# Patient Record
Sex: MALE | Race: WHITE | NOT HISPANIC OR LATINO | Employment: FULL TIME
[De-identification: names, ages, dates, MRNs, and addresses within clinical notes are randomized per-mention and may not be internally consistent; named-entity substitution may affect disease eponyms.]

---

## 2017-01-25 ENCOUNTER — RECORDS - HEALTHEAST (OUTPATIENT)
Dept: ADMINISTRATIVE | Facility: OTHER | Age: 27
End: 2017-01-25

## 2020-11-02 ENCOUNTER — OFFICE VISIT - HEALTHEAST (OUTPATIENT)
Dept: FAMILY MEDICINE | Facility: CLINIC | Age: 30
End: 2020-11-02

## 2020-11-02 DIAGNOSIS — L40.9 PSORIASIS: ICD-10-CM

## 2020-11-02 ASSESSMENT — MIFFLIN-ST. JEOR: SCORE: 1723.09

## 2020-11-03 ENCOUNTER — COMMUNICATION - HEALTHEAST (OUTPATIENT)
Dept: ADMINISTRATIVE | Facility: CLINIC | Age: 30
End: 2020-11-03

## 2021-05-26 ENCOUNTER — RECORDS - HEALTHEAST (OUTPATIENT)
Dept: ADMINISTRATIVE | Facility: CLINIC | Age: 31
End: 2021-05-26

## 2021-05-28 ENCOUNTER — RECORDS - HEALTHEAST (OUTPATIENT)
Dept: ADMINISTRATIVE | Facility: CLINIC | Age: 31
End: 2021-05-28

## 2021-06-03 ENCOUNTER — RECORDS - HEALTHEAST (OUTPATIENT)
Dept: ADMINISTRATIVE | Facility: CLINIC | Age: 31
End: 2021-06-03

## 2021-06-04 VITALS
DIASTOLIC BLOOD PRESSURE: 68 MMHG | OXYGEN SATURATION: 96 % | WEIGHT: 185 LBS | BODY MASS INDEX: 30.82 KG/M2 | HEART RATE: 94 BPM | HEIGHT: 65 IN | SYSTOLIC BLOOD PRESSURE: 100 MMHG

## 2021-06-06 ENCOUNTER — HEALTH MAINTENANCE LETTER (OUTPATIENT)
Age: 31
End: 2021-06-06

## 2021-06-12 NOTE — PROGRESS NOTES
ASSESSMENT/PLAN:       1. Psoriasis involving the scalp and left lower leg  Shared with the patient that the etiology is not clear but sometimes can be related to triggers such as stress, smoking and infectious diseases.  No clear trigger that occurred 8 months ago.  I shared with the patient that there are many more treatment options for psoriasis and thus I would like to have a dermatology consultation but while were waiting for that begins some steroids with a solution to be used on the scalp and an ointment on the left lower leg.  He certainly can continue with the cold tar-based shampoo for his scalp as that does seem to be providing some benefit.    - Ambulatory referral to Dermatology  - fluocinolone (SYNALAR) 0.01 % external solution; Apply to scalp twice daily for psoriasis  Dispense: 60 mL; Refill: 0  - clobetasoL (TEMOVATE) 0.05 % ointment; Apply to rash leg twice daily prn  Dispense: 45 g; Refill: 1            Lopez Summers MD      PROGRESS NOTE   11/2/2020    SUBJECTIVE:  Keshawn Jimenez is a 30 y.o. male  who presents for   Chief Complaint   Patient presents with     Psoriasis     Psoriasis on head and left leg      The patient is here today to talk about a rash that has been present for about 8 months.  Over that 8-month period of time it has become a larger area in the 2 regions where it first was noted which is the frontal scalp along the hairline and also the left lower leg.  The rash on the hairline and scalp is been more red and itchy with thick scale and cracks in the skin.  The last month the area has grown quite a bit and become more symptomatic.  For the last week he has been using a cold tar-based shampoo called .  It seems to be drying it up a bit on the scalp but actually now more cracks are present and there is some discomfort.  On the left lower leg gets over the shin started as a small silver dollar sized area and now it is much larger does not have a stick of a scale but does  "have a plaque-like appearance with very minimal cracking or fissuring of the skin.    The patient is single living at home with his parents and works as a baker.  He has had a history of heroin abuse and also methamphetamine abuse currently is on methadone 70 mg every day.  No alcohol use  Smokes cigarettes    Patient Active Problem List   Diagnosis     Anxiety     History of heroin abuse (H)     History of amphetamine dependence/abuse (H)     Allergic Rhinitis     Lower Back Pain     Chronic Constipation     Abdominal Pain In The Right Lower Belly (RLQ)     Long-term current use of methadone for opiate dependence (H)       Current Outpatient Medications   Medication Sig Dispense Refill     methadone (DOLOPHINE) 10 mg/mL solution Take 70 mg by mouth daily.        clobetasoL (TEMOVATE) 0.05 % ointment Apply to rash leg twice daily prn 45 g 1     fluocinolone (SYNALAR) 0.01 % external solution Apply to scalp twice daily for psoriasis 60 mL 0     No current facility-administered medications for this visit.        Social History     Tobacco Use   Smoking Status Current Every Day Smoker     Packs/day: 0.50     Types: Cigarettes   Smokeless Tobacco Never Used           OBJECTIVE:        No results found for this or any previous visit (from the past 240 hour(s)).    Vitals:    11/02/20 1426   BP: 100/68   Pulse: 94   SpO2: 96%   Weight: 185 lb (83.9 kg)   Height: 5' 4.5\" (1.638 m)     Weight: 185 lb (83.9 kg)          Physical Exam:  GENERAL APPEARANCE: Very pleasant 30-year-old male, NAD, well hydrated, well nourished  SKIN:  Normal skin turgor, the rash on the scalp is along the hairline from the frontal to the temporal region with erythema cracking of the skin a fairly thick scale and only extends backward about 2-3 cm.  On the left lower leg on the shin skin color is more salmon-colored with a scale is not as thick and not as much fissuring or cracking of the skin.  It has more of a fine scale with no weeping or " drainage.  HEENT: moist mucous membranes, no rhinorrhea  NECK: Normal without adenopathy or masses  EXTREMITY: no edema and full ROM of all joints, there is no evidence of synovitis particularly in the small joints and also the fingernails and toenails do not show any deformities or pitting of the nails.  NEURO: no focal findings

## 2021-06-26 ENCOUNTER — HOSPITAL ENCOUNTER (EMERGENCY)
Dept: EMERGENCY MEDICINE | Facility: CLINIC | Age: 31
Discharge: HOME | End: 2021-06-26
Attending: STUDENT IN AN ORGANIZED HEALTH CARE EDUCATION/TRAINING PROGRAM
Payer: COMMERCIAL

## 2021-06-26 DIAGNOSIS — R31.29 MICROSCOPIC HEMATURIA: ICD-10-CM

## 2021-06-26 DIAGNOSIS — N20.1 URETERAL STONE: ICD-10-CM

## 2021-06-26 DIAGNOSIS — R10.9 FLANK PAIN: ICD-10-CM

## 2021-06-26 LAB
ALBUMIN SERPL-MCNC: 4.1 G/DL (ref 3.5–5)
ALBUMIN UR-MCNC: NEGATIVE G/DL
ALP SERPL-CCNC: 53 U/L (ref 45–120)
ALT SERPL W P-5'-P-CCNC: 30 U/L (ref 0–45)
ANION GAP SERPL CALCULATED.3IONS-SCNC: 10 MMOL/L (ref 5–18)
APPEARANCE UR: CLEAR
AST SERPL W P-5'-P-CCNC: 21 U/L (ref 0–40)
BACTERIA #/AREA URNS HPF: ABNORMAL /[HPF]
BILIRUB DIRECT SERPL-MCNC: 0.3 MG/DL
BILIRUB SERPL-MCNC: 1 MG/DL (ref 0–1)
BILIRUB UR QL STRIP: NEGATIVE
BUN SERPL-MCNC: 13 MG/DL (ref 8–22)
CALCIUM SERPL-MCNC: 9.2 MG/DL (ref 8.5–10.5)
CHLORIDE BLD-SCNC: 104 MMOL/L (ref 98–107)
CO2 SERPL-SCNC: 25 MMOL/L (ref 22–31)
COLOR UR AUTO: ABNORMAL
CREAT SERPL-MCNC: 0.85 MG/DL (ref 0.7–1.3)
ERYTHROCYTE [DISTWIDTH] IN BLOOD BY AUTOMATED COUNT: 11.5 % (ref 11–14.5)
GFR SERPL CREATININE-BSD FRML MDRD: >60 ML/MIN/1.73M2
GLUCOSE BLD-MCNC: 114 MG/DL (ref 70–125)
GLUCOSE UR STRIP-MCNC: NEGATIVE MG/DL
HCT VFR BLD AUTO: 46.8 % (ref 40–54)
HGB BLD-MCNC: 16.3 G/DL (ref 14–18)
HGB UR QL STRIP: ABNORMAL
KETONES UR STRIP-MCNC: NEGATIVE MG/DL
LEUKOCYTE ESTERASE UR QL STRIP: NEGATIVE
LIPASE SERPL-CCNC: 13 U/L (ref 0–52)
MCH RBC QN AUTO: 30 PG (ref 27–34)
MCHC RBC AUTO-ENTMCNC: 34.8 G/DL (ref 32–36)
MCV RBC AUTO: 86 FL (ref 80–100)
MUCOUS THREADS #/AREA URNS LPF: PRESENT LPF
NITRATE UR QL: NEGATIVE
PH UR STRIP: 6 [PH] (ref 5–8)
PLATELET # BLD AUTO: 203 THOU/UL (ref 140–440)
PMV BLD AUTO: 9.3 FL (ref 8.5–12.5)
POTASSIUM BLD-SCNC: 4.1 MMOL/L (ref 3.5–5)
PROT SERPL-MCNC: 7.6 G/DL (ref 6–8)
RBC # BLD AUTO: 5.43 MILL/UL (ref 4.4–6.2)
RBC URINE: 6 HPF
SODIUM SERPL-SCNC: 139 MMOL/L (ref 136–145)
SP GR UR STRIP: 1.02 (ref 1–1.03)
SQUAMOUS EPITHELIAL: 0 /HPF
UROBILINOGEN UR STRIP-ACNC: ABNORMAL
WBC URINE: 1 HPF
WBC: 11 THOU/UL (ref 4–11)

## 2021-06-26 ASSESSMENT — MIFFLIN-ST. JEOR: SCORE: 1685.67

## 2021-06-29 ENCOUNTER — OFFICE VISIT - HEALTHEAST (OUTPATIENT)
Dept: UROLOGY | Facility: CLINIC | Age: 31
End: 2021-06-29

## 2021-06-29 DIAGNOSIS — N20.1 CALCULUS OF URETER: ICD-10-CM

## 2021-06-29 DIAGNOSIS — N20.0 CALCULUS OF KIDNEY: ICD-10-CM

## 2021-06-29 DIAGNOSIS — N13.2 HYDRONEPHROSIS WITH URINARY OBSTRUCTION DUE TO URETERAL CALCULUS: ICD-10-CM

## 2021-06-30 ENCOUNTER — COMMUNICATION - HEALTHEAST (OUTPATIENT)
Dept: UROLOGY | Facility: CLINIC | Age: 31
End: 2021-06-30

## 2021-07-06 VITALS — BODY MASS INDEX: 29.16 KG/M2 | HEIGHT: 65 IN | WEIGHT: 175 LBS

## 2021-07-07 NOTE — PATIENT INSTRUCTIONS - HE
Patient Stated Goal: Prevent further stones  Steps for collecting a 24 hour urine specimen    Please follow the directions carefully. All urine voided for a 24-hour period needs to be collected into the jug.  DO NOT change any of your  normal  daily habits when doing this test. Continue to follow your regular diet, intake of fluids, and usual activity level. Pick the most convenient day with your schedule, perhaps on a weekend or a day off.    Start your Diet Log the day before collection and continue on the day of urine collection.  You MUST bring Diet Log with you on follow up visit to discuss results.    One 24hr Urine Collection     Two 24hr Urine Collections  (do not collect on consecutive days)    PLEASE COMPLETE THE 2nd JUG WITHIN 1-2 WEEKS FROM THE 1st JUG    STEP 1  Empty your bladder completely into the toilet. This will be your start time. Write your full legal name, start date and time on the jug label.  Collection start and stop times need to match exactly!  For example:  6 am to 6 am.    STEP 2  The next time you urinate, empty your bladder directly into the jug or collection hat and pour urine into the jug.  Screw the lid back onto the jug.  Do not spill!    STEP 3  Place the jug in the refrigerator or a cooler with ice during the collection period.  Failure to keep it cool could cause inaccurate test results. DO NOT Freeze.    STEP 4  Continue collecting all urine into the jug for the rest of the day, for the full 24 hours.  DO NOT stop early or go over 24 hours!    STEP 5  Exactly 24 hours from start of collection, write your full legal name, stop date and time on the jug label.   Collection start and stop times need to match exactly!  For example:  6 am to 6 am.  Failure to label correctly will result in recollection of urine specimen.    STEP 6  Return each jug within 24 hours after final urination.     STEP 7  Drop off jug locations:   [facility]: Mon-Fri 7am-7pm - Closed on  weekends  [facility]: Mon-Fri 7am-5pm - Closed on Sunday  [facility]: Mon-Fri 7am-6:30pm - Closed on weekends    STEP 8  Please call [facility] after return of your final jug to schedule your follow-up visit. [phone]

## 2021-07-07 NOTE — ED PROVIDER NOTES
"IYefri, have reviewed the documentation, personally taken the patient's history, performed an exam and agree with the physical finds, diagnosis and management plan.    HPI:  Keshawn Jimenez is a 30 y.o. male with a history of kidney stones, lower back pain, amphetamine and heroin abuse on methadone who presents for evaluation of right flank pain. Reports onset of right flank and epigastric pain at 0500 this morning. Patient had continuing flank pain after passing a bowel movement and inducing vomiting. Reports associating nausea, shortness of breath, and light headache. States his pain feels similar but more intense compared to a kidney stone he had 5-6 years ago.       ROS:   Review of Systems   Constitutional: Negative for fever, chills and fatigue.   HENT: Negative for neck pain.    Eyes: Negative for visual disturbance.   Respiratory: Positive for shortness of breath. Negative for chest tightness.  Cardiovascular: Negative for chest pain.   Gastrointestinal: Positive for epigastric pain, nausea, and vomiting.  Negative for nausea, vomiting and diarrhea.   Genitourinary: Positive for right flank pain. Negative for dysuria.   Musculoskeletal: Negative for back pain.   Skin: Negative for color change.   Neurological: Positive for headache. Negative for dizziness, weakness and numbness.   All other systems reviewed and are negative.    Physical Exam:     /76   Pulse (!) 104   Temp 99.8  F (37.7  C) (Oral)   Resp 22   Ht 5' 5\" (1.651 m)   Wt 175 lb (79.4 kg)   SpO2 97%   BMI 29.12 kg/m       Constitutional:  Well developed, well nourished, no acute distress   Integument: Warm, Dry, No erythema, No rash.   EYES: Conjunctivae clear, EOMI  HENT:  Atraumatic, external ears normal, nose normal, oropharynx moist. Neck- supple  Respiratory:  No respiratory distress, normal breath sounds, no rales, no wheezing   Cardiovascular:  Normal rate, normal rhythm, no murmurs, capillary refill normal.  No chest " tenderness, no extremity edema  GI:  Soft, nondistended, nontender, no palpable masses, no rebound, no guarding   : Mild right flank pain  Musculoskeletal:  No edema.  Range of motion major extremities intact. No tenderness to palpation or major deformities noted.    Neurologic:  Alert & oriented, no focal deficits noted  Psych: Affect normal, Mood normal.            MDM:    12:00 PM I met the patient and discussed plan for discharge.     Briefly, patient is a 30-year-old history of kidney stones, when I was staffed patient his blood work and imaging had been done and he had a diagnosis of a small right-sided kidney stone.  Mild renal congestion.  No infection in the urine.  Blood work otherwise reassuring.    Patient's pain was significantly improved here.  Looks good clinically.  I suspect he will pass his stones quickly.  Given kidney stone and stone discharged home stable condition.            Final Diagnosis: kidney stone          The creation of this record is based on the scribe s observations of the work being performed by Yefri Montilla DO and the provider s statements to them. It was created on his behalf by Neo Dias, a trained medical scribe. This document has been checked and approved by the attending provider.         Yefri Montilla DO  06/26/21 [id number]

## 2021-07-07 NOTE — PROGRESS NOTES
Assessment/Plan:    Assessment & Plan   Keshawn was seen today for new problem - ed referred.    Diagnoses and all orders for this visit:    Calculus of kidney  -     Patient Stated Goal: Prevent further stones  -     24 Hour Urine Collection Steps Education    Calculus of ureter    Hydronephrosis with urinary obstruction due to ureteral calculus    Stone Management Plan  [facility] 6/29/2021   Urinary Tract Infection No suspicion of infection   Renal Colic Well controlled symptoms   Renal Failure No suspicion of renal failure   Current CT date 6/26/2021   Right sided stones? Yes   R Number of ureteral stones 2   R GSD of ureteral stones 2   R Location of ureteral stone Distal   R Number of kidney stones  No renal stones   R GSD of kidney stones N/A   R Hydronephrosis Mild   R Stone Event New event   Diagnosis date 6/26/2021   Initial location of primary symptomatic stone Distal   Initial GSD of primary symptomatic stone 2   R MET status Initiation   R Current Plan MET   MET (No Data)   Left sided stones? Yes   L Number of ureteral stones No ureteral stones   L Number of kidney stones  2   L GSD of kidney stones 4 - 10   L Hydronephrosis None   L Stone Event No current event   L Current Plan Observe   Observe rationale Limited stone burden with good prognosis for spontaneous passage           PLAN    31 yo M with history of calcium based kidney stones, previously requiring stone surgery. Obstructing right distal ureteral stone (s), symptoms improved. Nonobstructing left renal stones.    Excellent prognosis for passage if not already transpired. Will proceed with medical expulsive therapy. Risks and benefits were detailed of medical expulsive therapy including probability of stone passage, recurrent renal colic, and requirement of emergency medical and/or surgical care and further imaging. Patient verbalized understanding. Patient agrees with plan as discussed. He will return in 4 weeks with updated  prevention workup, LITHOLINK x 2.    For symptom control, over the counter symptom control medications of ibuprofen, Dramamine and Tylenol were recommended.    Video visit duration: 18  minutes  Total visit 28 minutes; spent on the date of the encounter doing chart review, history and exam, documentation and further activities per the note    Mackenzie Bright PA-C  [facility]    Subjective:      HPI  Mr. Keshawn Jimenez is a 30 y.o.  male who is being evaluated via a billable video visit by [facility] [place] following [facility] ER visit for urolithiasis.    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 He has participated in stone risk evaluation in the remote past with uncertain findings. He has no identified modifiable stone risk factors. He has identified non-modifiable stone risks including:  early age at first stone and limited family history.    He was seen in ER 6/26/21 for acute onset right flank pain x few hours in duration. The pain was constant and sharp in quality, radiating into the right abdomen. He noted it was more severe than similar pain experienced in 2016 with a kidney stone event which was surgically managed by Dr. Olsen. Significant associated symptoms at presentation included:  nausea and headache and shortness of breath. Workup was notable for CT reporting small obstructing right ureteral stone (s) and contralateral renal stones. Labs were negative for infection or renal injury. Of note, PMH notable for substance and heroin abuse, currently on methadone. He was discharged with OTC medications.    He is doing much better, intermittent right flank discomfort. He has not captured a stone. He denies symptoms of fever, chills, flank pain, nausea, vomiting, urinary frequency and dysuria.     CT scan from 6/26/21 is personally reviewed and demonstrates 1-2 mm  adjacent right distal ureteral stone (s) with mild hydronephrosis. There are 2 nonobstructing left renal stones, largest 5 mm in lower pole, which has increased in size since 2016.    Significant labs from presentation include mild hematuria, no pyuria, negative nitrite, no bacteria, normal WBC, normal creatinine and normal potassium.     ROS   A 12 point comprehensive review of systems is negative except for HPI    Past Medical History:   Diagnosis Date     History of amphetamine dependence/abuse (H)      History of heroin abuse (H)           Kidney stone     right     No past surgical history on file.    Current Outpatient Medications   Medication Sig Dispense Refill     acetaminophen (TYLENOL) 500 MG tablet Take 2 tablets (1,000 mg total) by mouth 4 (four) times a day for 7 days. 56 tablet 0     clobetasoL (TEMOVATE) 0.05 % ointment Apply to rash leg twice daily prn 45 g 1     dimenhyDRINATE (DRAMAMINE) 50 MG tablet Take 1 tablet (50 mg total) by mouth at bedtime for 7 days. 7 tablet 0     dimenhyDRINATE (DRAMAMINE) 50 MG tablet Take 1 tablet (50 mg total) by mouth 4 (four) times a day as needed. 28 tablet 0     fluocinolone (SYNALAR) 0.01 % external solution Apply to scalp twice daily for psoriasis 60 mL 0     ibuprofen (ADVIL,MOTRIN) 200 MG tablet Take 2 tablets (400 mg total) by mouth 4 (four) times a day for 7 days. 56 tablet 0     methadone (DOLOPHINE) 10 mg/mL solution Take 70 mg by mouth daily.        No current facility-administered medications for this visit.      No Known Allergies    Social History     Socioeconomic History     Marital status: Single     Spouse name: Not on file     Number of children: Not on file     Years of education: Not on file     Highest education level: Not on file   Occupational History     Not on file   Social Needs     Financial resource strain: Not on file     Food insecurity     Worry: Not on file     Inability: Not on file     Transportation needs     Medical: Not on file      Non-medical: Not on file   Tobacco Use     Smoking status: Current Every Day Smoker     Packs/day: 0.50     Types: Cigarettes     Smokeless tobacco: Never Used   Substance and Sexual Activity     Alcohol use: No     Drug use: No     Comment: hx heroin abuse.  sober since 2015     Sexual activity: Not on file   Lifestyle     Physical activity     Days per week: Not on file     Minutes per session: Not on file     Stress: Not on file   Relationships     Social connections     Talks on phone: Not on file     Gets together: Not on file     Attends [employer] service: Not on file     Active member of club or organization: Not on file     Attends meetings of clubs or organizations: Not on file     Relationship status: Not on file     Intimate partner violence     Fear of current or ex partner: Not on file     Emotionally abused: Not on file     Physically abused: Not on file     Forced sexual activity: Not on file   Other Topics Concern     Not on file   Social History Narrative     Not on file       Family History   Problem Relation Age of Onset     Depression Mother      Mental illness Mother      Heart disease Mother         pacemaker     Depression Maternal Aunt      Mental illness Maternal Aunt      Mental illness Paternal Uncle      Schizophrenia Paternal Uncle      Mental illness Paternal Uncle      Schizophrenia Paternal Uncle      Aortic aneurysm Father      Hypertension Father        Objective:         Vitals:  No vitals were obtained today due to virtual visit.      Labs    Urinalysis POC (Office):  Nitrite, UA   Date Value Ref Range Status   06/26/2021 Negative Negative Final   11/08/2016 Negative Negative Final   09/19/2014 Negative Negative Final       Lab Urinalysis:  Blood, UA   Date Value Ref Range Status   06/26/2021 0.03 mg/dL (!) Negative Final   11/08/2016 Large (!) Negative Final   09/19/2014 Trace (!) Negative Final     Nitrite, UA   Date Value Ref Range Status   06/26/2021 Negative Negative  Final   11/08/2016 Negative Negative Final   09/19/2014 Negative Negative Final     Leukocytes, UA   Date Value Ref Range Status   06/26/2021 Negative Negative Final   11/08/2016 Trace (!) Negative Final   09/19/2014 Small (!) Negative Final     pH, UA   Date Value Ref Range Status   06/26/2021 6.0 5.0 - 8.0 Final   11/08/2016 5.5 4.5 - 8.0 Final   09/19/2014 5.5 4.5 - 8.0 Final    and Acute Labs   CBC   WBC   Date Value Ref Range Status   06/26/2021 11.0 4.0 - 11.0 thou/uL Final   11/15/2016 6.0 4.0 - 11.0 thou/uL Final   09/19/2014 9.3 4.0 - 11.0 thou/uL Final   04/21/2014 10.1 4.0 - 11.0 thou/uL Final     Hemoglobin   Date Value Ref Range Status   06/26/2021 16.3 14.0 - 18.0 g/dL Final   11/15/2016 16.0 14.0 - 18.0 g/dL Final   09/19/2014 16.0 14.0 - 18.0 g/dL Final     Platelets   Date Value Ref Range Status   06/26/2021 203 140 - 440 thou/uL Final   11/15/2016 232 140 - 440 thou/uL Final   09/19/2014 294 140 - 440 thou/uL Final    and Renal Panel  [facility]  Creatinine   Date Value Ref Range Status   06/26/2021 0.85 0.70 - 1.30 mg/dL Final   09/19/2014 1.04 0.70 - 1.30 mg/dL Final   08/13/2011 1.01 0.70 - 1.30 mg/dL Final     Potassium   Date Value Ref Range Status   06/26/2021 4.1 3.5 - 5.0 mmol/L Final   09/19/2014 4.4 3.5 - 5.0 mmol/L Final   08/13/2011 3.6 3.5 - 5.0 mmol/L Final     Calcium   Date Value Ref Range Status   06/26/2021 9.2 8.5 - 10.5 mg/dL Final   09/19/2014 10.2 8.5 - 10.5 mg/dL Final   08/13/2011 10.3 8.5 - 10.5 mg/dL Final

## 2021-07-07 NOTE — ED PROVIDER NOTES
EMERGENCY DEPARTMENT ENCOUNTER      NAME: Keshawn Jimenez  AGE: 30 y.o. male  YOB: 1990  MRN: [id number]  EVALUATION DATE & TIME: 6/26/2021  8:44 AM    PCP: Lopez Summers MD    ED PROVIDER: Alicia Norman PA-C      Chief Complaint   Patient presents with     Flank Pain         FINAL IMPRESSION:  1. Ureteral stone    2. Flank pain    3. Microscopic hematuria          MEDICAL DECISION MAKING:    Pertinent Labs & Imaging studies reviewed. (See chart for details)  30 y.o. 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  Patient reports that he awoke and noticed a pain in his right flank along with some abdominal discomfort.  He then had a bowel movement at which time he vomited profusely prompting his presentation to the emergency department.    On exam he is uncomfortable appearing, vital signs notable for initial tachycardia likely related to discomfort. Remainder are WNL.  There is no CVA tenderness.  He does have mild right upper quadrant tenderness to palpation.  No suprapubic or right lower quadrant tenderness.    Differential diagnosis includes cholecystitis, biliary colic, pancreatitis, constipation, bowel obstruction, appendicitis, renal stone, ureteral colic, UTI.  BMP is WNL, no evidence for decreased kidney function.  CBC also WNL, no leukocytosis or anemia.  UA is without evidence of infection--no nitrites or bacteria.  There is occult blood.  CT of the abdomen pelvis does reveal a 2 mm ureteral stone on the right causing mild hydroureter.    Suspect symptoms today are related to ureteral colic in the setting of ureteral stone. Size of stone appears promising for passage without intervention. Urinary strainer provided to the patient.  [facility]  He was given prescription for Dramamine (drowsy and non drowsy per protocol),  ibuprofen, and Tylenol.  As patient is on daily methadone, Roxicodone not prescribed at this time.  Symptoms were controlled in the emergency department with Toradol.    Discussed recommendation with patient and father.  They agree with plan of care.  Phone number provided for the [facility] and ambulatory referral was placed.  Provisional nature of today's diagnosis was discussed and strict return precautions were given.  Patient expressed understanding and he was discharged home in good condition.    0 minutes of critical care time     ED COURSE  9:16 AM   Met and evaluated patient. Discussed ED plan.   11:09 AM  Staffed the patient with Dr. Montilla  11:25 AM I updated the patient on results and plan of care.  12:00 PM discharged to home in good condition by RN.     MEDICATIONS GIVEN IN THE EMERGENCY:  Medications   ketorolac injection 15 mg (TORADOL) (15 mg Intravenous Given 6/26/21 0933)   ketorolac injection 15 mg (TORADOL) (15 mg Intravenous Given 6/26/21 1150)       NEW PRESCRIPTIONS STARTED AT TODAY'S ER VISIT  Discharge Medication List as of 6/26/2021 11:54 AM      START taking these medications    Details   acetaminophen (TYLENOL) 500 MG tablet Take 2 tablets (1,000 mg total) by mouth 4 (four) times a day for 7 days., Starting Sat 6/26/2021, Until Sat 7/3/2021, Print      !! dimenhyDRINATE (DRAMAMINE) 50 MG tablet Take 1 tablet (50 mg total) by mouth at bedtime for 7 days., Starting Sat 6/26/2021, Until Sat 7/3/2021, Print      !! dimenhyDRINATE (DRAMAMINE) 50 MG tablet Take 1 tablet (50 mg total) by mouth 4 (four) times a day as needed., Starting Sat 6/26/2021, Until Sat 7/3/2021, Print      ibuprofen (ADVIL,MOTRIN) 200 MG tablet Take 2 tablets (400 mg total) by mouth 4 (four) times a day for 7 days., Starting Sat 6/26/2021, Until Sat 7/3/2021, Print       !! - Potential duplicate medications found. Please discuss with provider.          "    =================================================================    HPI    Patient information was obtained from: Patient    Use of Intrepreter: N/A         Keshawn Jimenez is a 30 y.o. male with a history of abdominal pain, chronic constipation, kidney stones, lower back pain, and history of amphetamine and heroin abuse who presents to the ED by walk in for evaluation of pain in the right flank.    At 0500 this morning (06/26/2021) the patient woke up with stomach and right flank pain and went to the bathroom to relieve himself. Patient passed a bowel movement and afterward right flank was \"killing\" him inducing vomiting. His abdominal pain resolved, but his flank pain has persisted. Nausea, light headache and shortness of breath present. He has a history of kidney stones. About 5-6 years ago he had a stone surgically removed. His current symptoms are the same as his typical kidney stone symptoms, but they are more intense and not going away. Patient is on daily methadone. Other than his kidney stone surgery, he has no history of abdominal surgeries. Patient denies blood in urine or stool, fever, leg swelling, taking medication to relieve pain, and any other complaints.    REVIEW OF SYSTEMS   Review of Systems   Constitutional: Negative for activity change, fatigue and fever.   HENT: Negative for sore throat.    Eyes: Negative for visual disturbance.   Respiratory: Negative for cough, chest tightness, shortness of breath and wheezing.    Cardiovascular: Negative for chest pain, palpitations and leg swelling.   Gastrointestinal: Positive for abdominal pain (resolved), diarrhea, nausea and vomiting. Negative for blood in stool and constipation.   Genitourinary: Positive for flank pain (right). Negative for difficulty urinating and hematuria.   Musculoskeletal: Negative for back pain and myalgias.   Skin: Negative for pallor, rash and wound.   Neurological: Negative for dizziness, weakness, light-headedness, " numbness and headaches.   Hematological: Negative for adenopathy.   Psychiatric/Behavioral: Negative for agitation and confusion. The patient is not nervous/anxious.    All other systems reviewed and are negative.    PAST MEDICAL HISTORY:  Past Medical History:   Diagnosis Date     History of amphetamine dependence/abuse (H)      History of heroin abuse (H)           Kidney stone     right       PAST SURGICAL HISTORY:  History reviewed. No pertinent surgical history.        CURRENT MEDICATIONS:    No current facility-administered medications on file prior to encounter.      Current Outpatient Medications on File Prior to Encounter   Medication Sig     clobetasoL (TEMOVATE) 0.05 % ointment Apply to rash leg twice daily prn     fluocinolone (SYNALAR) 0.01 % external solution Apply to scalp twice daily for psoriasis     methadone (DOLOPHINE) 10 mg/mL solution Take 70 mg by mouth daily.        ALLERGIES:  No Known Allergies    FAMILY HISTORY:  Family History   Problem Relation Age of Onset     Depression Mother      Mental illness Mother      Heart disease Mother         pacemaker     Depression Maternal Aunt      Mental illness Maternal Aunt      Mental illness Paternal Uncle      Schizophrenia Paternal Uncle      Mental illness Paternal Uncle      Schizophrenia Paternal Uncle      Aortic aneurysm Father      Hypertension Father        SOCIAL HISTORY:   Social History     Socioeconomic History     Marital status: Single     Spouse name: Not on file     Number of children: Not on file     Years of education: Not on file     Highest education level: Not on file   Occupational History     Not on file   Social Needs     Financial resource strain: Not on file     Food insecurity     Worry: Not on file     Inability: Not on file     Transportation needs     Medical: Not on file     Non-medical: Not on file   Tobacco Use     Smoking status: Current Every Day Smoker     Packs/day: 0.50     Types: Cigarettes     Smokeless  "tobacco: Never Used   Substance and Sexual Activity     Alcohol use: No     Drug use: No     Comment: hx heroin abuse.  sober since 2015     Sexual activity: Not on file   Lifestyle     Physical activity     Days per week: Not on file     Minutes per session: Not on file     Stress: Not on file   Relationships     Social connections     Talks on phone: Not on file     Gets together: Not on file     Attends Spiritism service: Not on file     Active member of club or organization: Not on file     Attends meetings of clubs or organizations: Not on file     Relationship status: Not on file     Intimate partner violence     Fear of current or ex partner: Not on file     Emotionally abused: Not on file     Physically abused: Not on file     Forced sexual activity: Not on file   Other Topics Concern     Not on file   Social History Narrative     Not on file         VITALS:  Patient Vitals for the past 24 hrs:   BP Temp Temp src Pulse Resp SpO2 Height Weight   06/26/21 1152 -- -- -- 97 -- 97 % -- --   06/26/21 1150 106/69 -- -- -- -- -- -- --   06/26/21 1149 -- -- -- 98 -- 97 % -- --   06/26/21 0933 -- -- -- (!) 104 -- 97 % -- --   06/26/21 0932 113/76 -- -- (!) 105 -- 98 % -- --   06/26/21 0848 136/79 99.8  F (37.7  C) Oral (!) 108 22 97 % 5' 5\" (1.651 m) 175 lb (79.4 kg)       PHYSICAL EXAM    Physical Exam   Constitutional: He is oriented to person, place, and time. He appears well-developed and well-nourished.   Appears uncomfortable.   HENT:   Head: Normocephalic and atraumatic.   Eyes: Conjunctivae are normal. No scleral icterus.   Neck: Normal range of motion.   Cardiovascular: Regular rhythm. Tachycardia present.   No murmur heard.  Pulmonary/Chest: Effort normal and breath sounds normal. No respiratory distress. He has no wheezes.   Abdominal: Soft. He exhibits no distension. There is abdominal tenderness in the right upper quadrant. There is no rebound and no CVA tenderness.   No suprapubic, periumbilical, or RLQ " tenderness.   Musculoskeletal: Normal range of motion.         General: No tenderness, deformity or edema.   Neurological: He is alert and oriented to person, place, and time. No cranial nerve deficit.   Skin: Skin is warm and dry. No rash noted. He is not diaphoretic. No erythema.   Psychiatric: He has a normal mood and affect. His behavior is normal.   Vitals reviewed.        LAB:  All pertinent labs reviewed and interpreted.    Labs Reviewed   URINALYSIS, MACRO REFLEX MICRO - Abnormal       Result Value    Color, UA Light Yellow      Clarity, UA Clear      Glucose, UA Negative      Protein, UA Negative      Bilirubin, UA Negative      Urobilinogen, UA <2.0 mg/dL      pH, UA 6.0      Blood, UA 0.03 mg/dL (*)     Ketones, UA Negative      Nitrite, UA Negative      Leukocytes, UA Negative      Specific Gravity, UA 1.022      RBC, UA 6 (*)     WBC UA 1      Bacteria, UA None Seen      Squamous Epithel, UA 0      Mucus, UA Present (*)    HM2(CBC W/O DIFFERENTIAL) - Normal    WBC 11.0      RBC 5.43      Hemoglobin 16.3      Hematocrit 46.8      MCV 86      MCH 30.0      MCHC 34.8      RDW 11.5      Platelets 203      MPV 9.3     BASIC METABOLIC PANEL - Normal    Sodium 139      Potassium 4.1      Chloride 104      CO2 25      Anion Gap, Calculation 10      Glucose 114      Calcium 9.2      BUN 13      Creatinine 0.85      GFR MDRD Af Amer >60      GFR MDRD Non Af Amer >60      Narrative:     Fasting Glucose reference range is 70-99 mg/dL per  American Diabetes Association (ADA) guidelines.   HEPATIC PROFILE - Normal    Bilirubin, Total 1.0      Bilirubin, Direct 0.3      Protein, Total 7.6      Albumin 4.1      Alkaline Phosphatase 53      AST 21      ALT 30     LIPASE - Normal    Lipase 13           RADIOLOGY:  Reviewed all pertinent imaging. Please see official radiology report    CT Abdomen Pelvis Without Oral Without IV Contrast    1.  There is either a single elongated or 2 tiny, adjacent stones within the right  distal ureter causing mild hydroureter.     2.  There are 2 intrarenal stones on the left without obstruction.     3.  Small hiatal hernia containing fluid, correlate for signs or symptoms of reflux.    I, Cecil Dey, am serving as a scribe to document services personally performed by Alicia Norman PA-C based on my observation and the provider's statements to me. I, Alicia Norman PA-C attest that Cecil Dey is acting in a scribe capacity, has observed my performance of the services and has documented them in accordance with my direction.      Alicia Norman PA-C  Emergency Medicine  [facility] EMERGENCY ROOM  [address] [address]  Dept: [phone]  Loc: [phone]     Alicia Norman PA-C  06/26/21 1252

## 2021-07-07 NOTE — ED TRIAGE NOTES
Patient states since 0500 today he has had right flank pain into right lower back and nausea that is continuous.  He has a histroy of kidney stones and takes Methadone for recovery.

## 2021-07-07 NOTE — PROGRESS NOTES
Patient states that they are in [place] at the time of their visit.  Patient is aware telehealth visit is billable and agrees to proceed.  Theresa Ceja RN

## 2021-08-18 ENCOUNTER — APPOINTMENT (OUTPATIENT)
Dept: RADIOLOGY | Facility: CLINIC | Age: 31
End: 2021-08-18
Attending: EMERGENCY MEDICINE
Payer: COMMERCIAL

## 2021-08-18 ENCOUNTER — APPOINTMENT (OUTPATIENT)
Dept: CT IMAGING | Facility: CLINIC | Age: 31
End: 2021-08-18
Attending: EMERGENCY MEDICINE
Payer: COMMERCIAL

## 2021-08-18 ENCOUNTER — HOSPITAL ENCOUNTER (EMERGENCY)
Facility: CLINIC | Age: 31
Discharge: HOME | End: 2021-08-18
Attending: EMERGENCY MEDICINE | Admitting: EMERGENCY MEDICINE
Payer: COMMERCIAL

## 2021-08-18 VITALS
TEMPERATURE: 97.3 F | HEIGHT: 65 IN | RESPIRATION RATE: 18 BRPM | HEART RATE: 105 BPM | OXYGEN SATURATION: 100 % | DIASTOLIC BLOOD PRESSURE: 86 MMHG | SYSTOLIC BLOOD PRESSURE: 129 MMHG | BODY MASS INDEX: 28.32 KG/M2 | WEIGHT: 170 LBS

## 2021-08-18 DIAGNOSIS — S63.502A WRIST SPRAIN, LEFT, INITIAL ENCOUNTER: ICD-10-CM

## 2021-08-18 DIAGNOSIS — S82.142A CLOSED FRACTURE OF LEFT TIBIAL PLATEAU, INITIAL ENCOUNTER: ICD-10-CM

## 2021-08-18 DIAGNOSIS — S43.401A SPRAIN OF RIGHT SHOULDER, UNSPECIFIED SHOULDER SPRAIN TYPE, INITIAL ENCOUNTER: ICD-10-CM

## 2021-08-18 PROCEDURE — 73110 X-RAY EXAM OF WRIST: CPT | Mod: LT

## 2021-08-18 PROCEDURE — 250N000013 HC RX MED GY IP 250 OP 250 PS 637: Performed by: EMERGENCY MEDICINE

## 2021-08-18 PROCEDURE — 73030 X-RAY EXAM OF SHOULDER: CPT | Mod: RT

## 2021-08-18 PROCEDURE — 73562 X-RAY EXAM OF KNEE 3: CPT | Mod: LT

## 2021-08-18 PROCEDURE — 29505 APPLICATION LONG LEG SPLINT: CPT | Mod: LT

## 2021-08-18 PROCEDURE — 99285 EMERGENCY DEPT VISIT HI MDM: CPT | Mod: 25

## 2021-08-18 PROCEDURE — 73700 CT LOWER EXTREMITY W/O DYE: CPT | Mod: LT

## 2021-08-18 RX ORDER — OXYCODONE HYDROCHLORIDE 5 MG/1
5 TABLET ORAL EVERY 6 HOURS PRN
Qty: 12 TABLET | Refills: 0 | Status: SHIPPED | OUTPATIENT
Start: 2021-08-18 | End: 2021-08-21

## 2021-08-18 RX ORDER — IBUPROFEN 600 MG/1
600 TABLET, FILM COATED ORAL ONCE
Status: COMPLETED | OUTPATIENT
Start: 2021-08-18 | End: 2021-08-18

## 2021-08-18 RX ORDER — ACETAMINOPHEN 325 MG/1
975 TABLET ORAL ONCE
Status: COMPLETED | OUTPATIENT
Start: 2021-08-18 | End: 2021-08-18

## 2021-08-18 RX ADMIN — ACETAMINOPHEN 975 MG: 325 TABLET ORAL at 21:37

## 2021-08-18 RX ADMIN — IBUPROFEN 600 MG: 600 TABLET ORAL at 21:36

## 2021-08-18 ASSESSMENT — MIFFLIN-ST. JEOR: SCORE: 1662.99

## 2021-08-19 ENCOUNTER — TRANSFERRED RECORDS (OUTPATIENT)
Dept: HEALTH INFORMATION MANAGEMENT | Facility: CLINIC | Age: 31
End: 2021-08-19

## 2021-08-19 NOTE — ED PROVIDER NOTES
EMERGENCY DEPARTMENT ENCOUNTER      NAME: Keshawn Jimenez  AGE: 30 year old male  YOB: 1990  MRN: [id number]  EVALUATION DATE & TIME: 8/18/2021  8:10 PM    PCP: Lopez Summers    ED PROVIDER: Prabhjot Tomas D.O.      Chief Complaint   Patient presents with     Fall     Knee Pain     Shoulder Pain     Wrist Pain         HPI    Patient information was obtained from: patient    Use of : N/A       Keshawn Jimenez is a 30 year old male with no recorded pertinent medical history who presents to this ED by walk in with 2 family members for the evaluation of a fall, left knee, wrist pain and right shoulder blade pain. Patient states he fell and injured his left wrist, knee, and arm as well as his right shoulder blade. During the fall, the patient believes his left wrist bore much of his body weight. Patient's left knee pain is getting worse and is provoked by walking. Patient states left arm pain his pain is radiating into his fingers. He states he cannot move his left wrist, but is able to move his fingers. Patient's right shoulder blade is provoked by sitting in certain position, but has little pain at rest.    Patient denies cough, congestion, chest pain, or any other symptoms at this time.        REVIEW OF SYSTEMS  Constitutional:  Denies fever, chills, weight loss or weakness  Eyes:  No pain, discharge, redness  HENT:  Denies sore throat, ear pain, congestion  Respiratory: No SOB, wheeze or cough  Cardiovascular:  No CP, palpitations  GI:  Denies abdominal pain, nausea, vomiting, diarrhea  : Denies dysuria, hematuria  Musculoskeletal: Positive for left knee, arm, wrist pain, right shoulder blade pain. Denies any new muscle swelling.  Skin:  Denies rash, pallor  Neurologic:  Denies headache, focal weakness or sensory changes  Lymph: Denies swollen nodes    All other systems negative unless noted in HPI.    PAST MEDICAL HISTORY:  Past Medical History:   Diagnosis Date     Chemical dependency (H)       Depressive disorder      History of amphetamine dependence/abuse (H)      History of heroin abuse (H)           Kidney stone     right       PAST SURGICAL HISTORY:  History reviewed. No pertinent surgical history.      CURRENT MEDICATIONS:    No current facility-administered medications for this encounter.     Current Outpatient Medications   Medication     METHADONE HCL PO     oxyCODONE (ROXICODONE) 5 MG tablet         ALLERGIES:  No Known Allergies    FAMILY HISTORY:  Family History   Problem Relation Age of Onset     Depression Mother      Mental Illness Mother      Heart Disease Mother         pacemaker     Depression Maternal Aunt      Mental Illness Maternal Aunt      Mental Illness Paternal Uncle      Schizophrenia Paternal Uncle      Mental Illness Paternal Uncle      Schizophrenia Paternal Uncle      Aortic aneurysm Father      Hypertension Father        SOCIAL HISTORY:  Social History     Socioeconomic History     Marital status: Single     Spouse name: Not on file     Number of children: Not on file     Years of education: Not on file     Highest education level: Not on file   Occupational History     Not on file   Tobacco Use     Smoking status: Current Every Day Smoker     Packs/day: 0.50     Years: 6.00     Pack years: 3.00     Types: Cigarettes, Cigarettes     Smokeless tobacco: Never Used   Substance and Sexual Activity     Alcohol use: No     Drug use: No     Types: IV     Comment: Drug use: hx heroin abuse.  sober since 2015     Sexual activity: Not on file   Other Topics Concern     Not on file   Social History Narrative     Not on file     Social Determinants of Health     Financial Resource Strain:      Difficulty of Paying Living Expenses:    Food Insecurity:      Worried About Running Out of Food in the Last Year:      Ran Out of Food in the Last Year:    Transportation Needs:      Lack of Transportation (Medical):      Lack of Transportation (Non-Medical):    Physical Activity:      Days  "of Exercise per Week:      Minutes of Exercise per Session:    Stress:      Feeling of Stress :    Social Connections:      Frequency of Communication with Friends and Family:      Frequency of Social Gatherings with Friends and Family:      Attends Buddhist Services:      Active Member of Clubs or Organizations:      Attends Club or Organization Meetings:      Marital Status:    Intimate Partner Violence:      Fear of Current or Ex-Partner:      Emotionally Abused:      Physically Abused:      Sexually Abused:        VITALS:  Patient Vitals for the past 24 hrs:   BP Temp Temp src Pulse Resp SpO2 Height Weight   08/18/21 2007 129/86 97.3  F (36.3  C) Temporal 105 18 100 % 1.651 m (5' 5\") 77.1 kg (170 lb)       PHYSICAL EXAM    Vitals: /86   Pulse 105   Temp 97.3  F (36.3  C) (Temporal)   Resp 18   Ht 1.651 m (5' 5\")   Wt 77.1 kg (170 lb)   SpO2 100%   BMI 28.29 kg/m    General: Appears in no acute distress, awake, alert, interactive.  Eyes: Conjunctivae non-injected. Sclera anicteric.  HENT: Atraumatic, normocephalic  Neck: Supple, normal ROM  Respiratory/Chest: Respiration unlabored, no tachypnea  Abdomen: non distended  Musculoskeletal: Extremities- Tenderness to palpation to left wrist. Tenderness in anatomical snuff box. Neuro vasculature intact distally. Pain to active abduction against resistance in right shoulder, but no significant pain to palpation. Mild tenderness over patella of left knee. No edema or erythema.  Skin: Normal color. No rash or diaphoresis.  Neurologic: Alert and oriented, face symmetric, moves all extremities spontaneously. Speech clear.  Psychiatric:  Affect normal, Judgment normal, Mood normal.        LABS  Results for orders placed or performed during the hospital encounter of 08/18/21 (from the past 24 hour(s))   XR Shoulder Right 2 Views    Narrative    EXAM: XR SHOULDER 2 VIEW RIGHT  LOCATION: [facility]  DATE/TIME: 8/18/2021 8:22 " PM    INDICATION: fall, pain  COMPARISON: None.      Impression    IMPRESSION: Normal joint spaces and alignment. No fracture.    XR Wrist Left G/E 3 Views    Narrative    EXAM: XR WRIST LEFT G/E 3 VIEWS  LOCATION: [facility]  DATE/TIME: 8/18/2021 8:22 PM    INDICATION: Trauma and wrist pain.  COMPARISON: None.      Impression    IMPRESSION: Normal joint spaces and alignment. No fracture.   XR Knee Left 3 Views    Narrative    EXAM: XR KNEE LEFT 3 VIEWS  LOCATION: [facility]  DATE/TIME: 8/18/2021 8:22 PM    INDICATION: Trauma and knee pain.  COMPARISON: None.      Impression    IMPRESSION: There is a minimally impacted intra-articular fracture of the lateral aspect of the tibial plateau and there is a moderate-sized lipohemarthrosis. Normal otherwise.   CT Knee Left w/o Contrast    Narrative    EXAM: CT KNEE LEFT W/O CONTRAST  LOCATION: [facility]  DATE/TIME: 8/18/2021 9:12 PM    INDICATION: CT to better characterize the known lateral tibial plateau fracture.    COMPARISON: Radiographs from today.    TECHNIQUE: Noncontrast. Axial, sagittal and coronal thin-section reconstruction. Dose reduction techniques were used.     FINDINGS:   BONES AND JOINTS:  -There is a minimally impacted fracture of the lateral tibial plateau. Fracture is 16 mm transverse x 26 mm AP x 6 mm in depth. It is impacted 1 mm. There is a gap and step-off at the articular surface of less than 1 mm. No other fractures.    Moderate-sized lipohemarthrosis. Small leaking popliteal cyst.    SOFT TISSUES:  -No hematoma. No muscle atrophy.      Impression    IMPRESSION:  1.  Minimally impacted intra-articular fracture of the lateral tibial plateau. There is a gap and step-off at the articular surface of less than 1 mm.  2.  Moderate-sized lipohemarthrosis and small leaking popliteal cyst.           RADIOLOGY  CT Knee Left w/o Contrast   Final Result   IMPRESSION:   1.   Minimally impacted intra-articular fracture of the lateral tibial plateau. There is a gap and step-off at the articular surface of less than 1 mm.   2.  Moderate-sized lipohemarthrosis and small leaking popliteal cyst.         XR Knee Left 3 Views   Final Result   IMPRESSION: There is a minimally impacted intra-articular fracture of the lateral aspect of the tibial plateau and there is a moderate-sized lipohemarthrosis. Normal otherwise.      XR Wrist Left G/E 3 Views   Final Result   IMPRESSION: Normal joint spaces and alignment. No fracture.      XR Shoulder Right 2 Views   Final Result   IMPRESSION: Normal joint spaces and alignment. No fracture.              FINAL IMPRESSION:  1. Wrist sprain, left, initial encounter    2. Closed fracture of left tibial plateau, initial encounter    3. Sprain of right shoulder, unspecified shoulder sprain type, initial encounter          ED COURSE & MEDICAL DECISION MAKIN:42 PM I met with the patient to gather history and to perform my initial exam. I discussed the plan for care while in the Emergency Department. PPE worn including N95 mask, surgical gloves.  10:34 PM I spoke with Dr. Flores from [facility]. Discussed findings with patient and plans for discharge.    Pertinent Labs & Imaging studies reviewed. (See chart for details)  30 year old male presents to the Emergency Department for evaluation of left knee, left wrist, and right shoulder pain status post mechanical fall this evening.  Shoulder and wrist films do not show any evidence of fracture.  Do suspect sprain/contusion of both.  Of greater concern was his left knee which does show a mild tibial plateau fracture.  I consulted with orthopedic surgery, place the patient in a knee immobilizer, and will discharge with crutches and pain control.  He will follow-up with [facility] in the next 24 to 48 hours.  Return precautions were discussed.    At the conclusion of the encounter I discussed the  results of all of the tests and the disposition. The questions were answered. The patient or family acknowledged understanding and was agreeable with the care plan.        MEDICATIONS GIVEN IN THE EMERGENCY:  Medications   acetaminophen (TYLENOL) tablet 975 mg (975 mg Oral Given 8/18/21 2137)   ibuprofen (ADVIL/MOTRIN) tablet 600 mg (600 mg Oral Given 8/18/21 2136)       NEW PRESCRIPTIONS STARTED AT TODAY'S ER VISIT  Discharge Medication List as of 8/18/2021 10:54 PM      START taking these medications    Details   oxyCODONE (ROXICODONE) 5 MG tablet Take 1 tablet (5 mg) by mouth every 6 hours as needed for pain, Disp-12 tablet, R-0, Local Print              I, Duy Fuentes, am serving as a scribe to document services personally performed by Prabhjot Tomas D.O., based on my observations and the provider's statements to me.  I, Prabhjto Tomas D.O., attest that Duy Fuentes is acting in a scribe capacity, has observed my performance of the services and has documented them in accordance with my direction.       Prabhjot Tomas D.O.  Emergency Medicine  [facility] EMERGENCY ROOM  [address] [id number] [phone]  Dept: [phone]     Prabhjot Tomas,   08/19/21 0116

## 2021-08-19 NOTE — ED TRIAGE NOTES
Pt presents to ED with c/o left wrist, right shoulder, and left knee pain.  Pt reports he was running when his left knee gave out, causing him to fall.  Pt states his wrist pain just seems to be getting worse.  CMS intact.  No medications taken PTA.

## 2021-09-14 ENCOUNTER — TRANSFERRED RECORDS (OUTPATIENT)
Dept: HEALTH INFORMATION MANAGEMENT | Facility: CLINIC | Age: 31
End: 2021-09-14

## 2021-09-21 ENCOUNTER — TRANSFERRED RECORDS (OUTPATIENT)
Dept: HEALTH INFORMATION MANAGEMENT | Facility: CLINIC | Age: 31
End: 2021-09-21

## 2021-09-26 ENCOUNTER — HEALTH MAINTENANCE LETTER (OUTPATIENT)
Age: 31
End: 2021-09-26

## 2021-10-21 ENCOUNTER — TRANSFERRED RECORDS (OUTPATIENT)
Dept: HEALTH INFORMATION MANAGEMENT | Facility: CLINIC | Age: 31
End: 2021-10-21
Payer: COMMERCIAL

## 2021-11-18 ENCOUNTER — TRANSFERRED RECORDS (OUTPATIENT)
Dept: HEALTH INFORMATION MANAGEMENT | Facility: CLINIC | Age: 31
End: 2021-11-18
Payer: COMMERCIAL

## 2021-11-22 ENCOUNTER — TRANSFERRED RECORDS (OUTPATIENT)
Dept: HEALTH INFORMATION MANAGEMENT | Facility: CLINIC | Age: 31
End: 2021-11-22
Payer: COMMERCIAL

## 2022-07-02 ENCOUNTER — HEALTH MAINTENANCE LETTER (OUTPATIENT)
Age: 32
End: 2022-07-02

## 2023-04-23 ENCOUNTER — HEALTH MAINTENANCE LETTER (OUTPATIENT)
Age: 33
End: 2023-04-23

## 2023-07-02 ENCOUNTER — APPOINTMENT (OUTPATIENT)
Dept: CT IMAGING | Facility: CLINIC | Age: 33
End: 2023-07-02
Attending: EMERGENCY MEDICINE
Payer: COMMERCIAL

## 2023-07-02 ENCOUNTER — HOSPITAL ENCOUNTER (EMERGENCY)
Facility: CLINIC | Age: 33
Discharge: HOME | End: 2023-07-02
Attending: EMERGENCY MEDICINE | Admitting: EMERGENCY MEDICINE
Payer: COMMERCIAL

## 2023-07-02 VITALS
TEMPERATURE: 98.8 F | BODY MASS INDEX: 30.79 KG/M2 | OXYGEN SATURATION: 97 % | WEIGHT: 185 LBS | SYSTOLIC BLOOD PRESSURE: 119 MMHG | DIASTOLIC BLOOD PRESSURE: 80 MMHG | RESPIRATION RATE: 16 BRPM | HEART RATE: 85 BPM

## 2023-07-02 DIAGNOSIS — R10.9 RIGHT FLANK PAIN: ICD-10-CM

## 2023-07-02 DIAGNOSIS — N23 URETERAL COLIC: ICD-10-CM

## 2023-07-02 LAB
ALBUMIN UR-MCNC: NEGATIVE MG/DL
ANION GAP SERPL CALCULATED.3IONS-SCNC: 13 MMOL/L (ref 5–18)
APPEARANCE UR: CLEAR
BASOPHILS # BLD AUTO: 0 10E3/UL (ref 0–0.2)
BASOPHILS NFR BLD AUTO: 0 %
BILIRUB UR QL STRIP: NEGATIVE
BUN SERPL-MCNC: 14 MG/DL (ref 8–22)
CALCIUM SERPL-MCNC: 9.7 MG/DL (ref 8.5–10.5)
CHLORIDE BLD-SCNC: 104 MMOL/L (ref 98–107)
CO2 SERPL-SCNC: 23 MMOL/L (ref 22–31)
COLOR UR AUTO: ABNORMAL
CREAT SERPL-MCNC: 1.17 MG/DL (ref 0.7–1.3)
EOSINOPHIL # BLD AUTO: 0.2 10E3/UL (ref 0–0.7)
EOSINOPHIL NFR BLD AUTO: 2 %
ERYTHROCYTE [DISTWIDTH] IN BLOOD BY AUTOMATED COUNT: 11.4 % (ref 10–15)
GFR SERPL CREATININE-BSD FRML MDRD: 85 ML/MIN/1.73M2
GLUCOSE BLD-MCNC: 106 MG/DL (ref 70–125)
GLUCOSE UR STRIP-MCNC: NEGATIVE MG/DL
HCT VFR BLD AUTO: 47.7 % (ref 40–53)
HGB BLD-MCNC: 16.4 G/DL (ref 13.3–17.7)
HGB UR QL STRIP: NEGATIVE
HOLD SPECIMEN: NORMAL
IMM GRANULOCYTES # BLD: 0 10E3/UL
IMM GRANULOCYTES NFR BLD: 0 %
KETONES UR STRIP-MCNC: NEGATIVE MG/DL
LEUKOCYTE ESTERASE UR QL STRIP: NEGATIVE
LYMPHOCYTES # BLD AUTO: 1.7 10E3/UL (ref 0.8–5.3)
LYMPHOCYTES NFR BLD AUTO: 18 %
MCH RBC QN AUTO: 30.1 PG (ref 26.5–33)
MCHC RBC AUTO-ENTMCNC: 34.4 G/DL (ref 31.5–36.5)
MCV RBC AUTO: 88 FL (ref 78–100)
MONOCYTES # BLD AUTO: 0.7 10E3/UL (ref 0–1.3)
MONOCYTES NFR BLD AUTO: 7 %
MUCOUS THREADS #/AREA URNS LPF: PRESENT /LPF
NEUTROPHILS # BLD AUTO: 6.8 10E3/UL (ref 1.6–8.3)
NEUTROPHILS NFR BLD AUTO: 73 %
NITRATE UR QL: NEGATIVE
NRBC # BLD AUTO: 0 10E3/UL
NRBC BLD AUTO-RTO: 0 /100
PH UR STRIP: 5.5 [PH] (ref 5–7)
PLATELET # BLD AUTO: 232 10E3/UL (ref 150–450)
POTASSIUM BLD-SCNC: 3.7 MMOL/L (ref 3.5–5)
RBC # BLD AUTO: 5.45 10E6/UL (ref 4.4–5.9)
RBC URINE: 2 /HPF
SODIUM SERPL-SCNC: 140 MMOL/L (ref 136–145)
SP GR UR STRIP: 1.02 (ref 1–1.03)
UROBILINOGEN UR STRIP-MCNC: <2 MG/DL
WBC # BLD AUTO: 9.4 10E3/UL (ref 4–11)
WBC URINE: 2 /HPF

## 2023-07-02 PROCEDURE — 250N000011 HC RX IP 250 OP 636: Mod: JZ | Performed by: EMERGENCY MEDICINE

## 2023-07-02 PROCEDURE — 96361 HYDRATE IV INFUSION ADD-ON: CPT

## 2023-07-02 PROCEDURE — 81001 URINALYSIS AUTO W/SCOPE: CPT | Performed by: EMERGENCY MEDICINE

## 2023-07-02 PROCEDURE — 250N000013 HC RX MED GY IP 250 OP 250 PS 637: Performed by: EMERGENCY MEDICINE

## 2023-07-02 PROCEDURE — 74177 CT ABD & PELVIS W/CONTRAST: CPT

## 2023-07-02 PROCEDURE — 96375 TX/PRO/DX INJ NEW DRUG ADDON: CPT

## 2023-07-02 PROCEDURE — 85025 COMPLETE CBC W/AUTO DIFF WBC: CPT | Performed by: EMERGENCY MEDICINE

## 2023-07-02 PROCEDURE — 258N000003 HC RX IP 258 OP 636: Performed by: EMERGENCY MEDICINE

## 2023-07-02 PROCEDURE — 36415 COLL VENOUS BLD VENIPUNCTURE: CPT | Performed by: EMERGENCY MEDICINE

## 2023-07-02 PROCEDURE — 99285 EMERGENCY DEPT VISIT HI MDM: CPT | Mod: 25

## 2023-07-02 PROCEDURE — 96374 THER/PROPH/DIAG INJ IV PUSH: CPT | Mod: 59

## 2023-07-02 PROCEDURE — 80048 BASIC METABOLIC PNL TOTAL CA: CPT | Performed by: EMERGENCY MEDICINE

## 2023-07-02 PROCEDURE — 96376 TX/PRO/DX INJ SAME DRUG ADON: CPT

## 2023-07-02 RX ORDER — KETOROLAC TROMETHAMINE 15 MG/ML
15 INJECTION, SOLUTION INTRAMUSCULAR; INTRAVENOUS ONCE
Status: COMPLETED | OUTPATIENT
Start: 2023-07-02 | End: 2023-07-02

## 2023-07-02 RX ORDER — IBUPROFEN 200 MG
400 TABLET ORAL EVERY 6 HOURS
Qty: 56 TABLET | Refills: 0 | Status: SHIPPED | OUTPATIENT
Start: 2023-07-02 | End: 2023-07-09

## 2023-07-02 RX ORDER — IOPAMIDOL 755 MG/ML
100 INJECTION, SOLUTION INTRAVASCULAR ONCE
Status: COMPLETED | OUTPATIENT
Start: 2023-07-02 | End: 2023-07-02

## 2023-07-02 RX ORDER — ACETAMINOPHEN 500 MG
1000 TABLET ORAL EVERY 6 HOURS
Qty: 56 TABLET | Refills: 0 | Status: SHIPPED | OUTPATIENT
Start: 2023-07-02 | End: 2023-07-09

## 2023-07-02 RX ORDER — ONDANSETRON 4 MG/1
4 TABLET, ORALLY DISINTEGRATING ORAL EVERY 8 HOURS PRN
Qty: 12 TABLET | Refills: 0 | Status: SHIPPED | OUTPATIENT
Start: 2023-07-02 | End: 2023-08-31

## 2023-07-02 RX ORDER — ACETAMINOPHEN 325 MG/1
975 TABLET ORAL ONCE
Status: COMPLETED | OUTPATIENT
Start: 2023-07-02 | End: 2023-07-02

## 2023-07-02 RX ORDER — DIMENHYDRINATE 50 MG
50 TABLET ORAL EVERY 6 HOURS PRN
Qty: 28 TABLET | Refills: 0 | Status: SHIPPED | OUTPATIENT
Start: 2023-07-02 | End: 2023-07-09

## 2023-07-02 RX ORDER — ONDANSETRON 2 MG/ML
4 INJECTION INTRAMUSCULAR; INTRAVENOUS ONCE
Status: COMPLETED | OUTPATIENT
Start: 2023-07-02 | End: 2023-07-02

## 2023-07-02 RX ORDER — OXYCODONE HYDROCHLORIDE 5 MG/1
5 TABLET ORAL EVERY 4 HOURS PRN
Qty: 12 TABLET | Refills: 0 | Status: SHIPPED | OUTPATIENT
Start: 2023-07-02 | End: 2023-07-06

## 2023-07-02 RX ORDER — DIMENHYDRINATE 50 MG
50 TABLET ORAL AT BEDTIME
Qty: 7 TABLET | Refills: 0 | Status: SHIPPED | OUTPATIENT
Start: 2023-07-02 | End: 2023-07-09

## 2023-07-02 RX ADMIN — IOPAMIDOL 91 ML: 755 INJECTION, SOLUTION INTRAVENOUS at 07:07

## 2023-07-02 RX ADMIN — SODIUM CHLORIDE 500 ML: 9 INJECTION, SOLUTION INTRAVENOUS at 06:12

## 2023-07-02 RX ADMIN — Medication 50 MG: at 06:16

## 2023-07-02 RX ADMIN — KETOROLAC TROMETHAMINE 15 MG: 15 INJECTION, SOLUTION INTRAMUSCULAR; INTRAVENOUS at 07:26

## 2023-07-02 RX ADMIN — ONDANSETRON 4 MG: 2 INJECTION INTRAMUSCULAR; INTRAVENOUS at 06:17

## 2023-07-02 RX ADMIN — KETOROLAC TROMETHAMINE 15 MG: 15 INJECTION, SOLUTION INTRAMUSCULAR; INTRAVENOUS at 06:17

## 2023-07-02 RX ADMIN — ACETAMINOPHEN 975 MG: 325 TABLET ORAL at 06:16

## 2023-07-02 ASSESSMENT — ENCOUNTER SYMPTOMS
SHORTNESS OF BREATH: 0
NAUSEA: 1
DIARRHEA: 0
COUGH: 0
VOMITING: 0
FEVER: 0
FREQUENCY: 1
ABDOMINAL PAIN: 1
DIFFICULTY URINATING: 0
FLANK PAIN: 1

## 2023-07-02 ASSESSMENT — ACTIVITIES OF DAILY LIVING (ADL): ADLS_ACUITY_SCORE: 35

## 2023-07-02 NOTE — ED NOTES
AIDET performed, white board updated for rounding. Patient updated on plan of care. Patient's pain assessed. Call light within reach, bed in low position, side rails up. Visitor at bedside: father.

## 2023-07-02 NOTE — ED TRIAGE NOTES
Pt here with right sided flank and back pain. States hx of stones. Started at 2100 and worsening. Has urgency and hesitancy urination,

## 2023-07-02 NOTE — ED NOTES
Provided pt with cristofer and educated on use. Reviewed discharge instructions and answered all questions. Reviewed new prescriptions.

## 2023-07-02 NOTE — Clinical Note
Keshawn Jimenez was seen and treated in our emergency department on 7/2/2023.  He may return to work on 07/05/2023.       If you have any questions or concerns, please don't hesitate to call.      Becca Seymour MD

## 2023-07-02 NOTE — DISCHARGE INSTRUCTIONS
You are passing 2 kidney stones on the right side with the largest being 3 mm.  You also have a kidney stone iwithn the left kidney that is 5 mm.    Call tomorrow make an appointment to follow-up with urology.  You can certainly follow-up with [facility] as you have seen them in the past.  You expressed interest in possibly going to the kidney stone [place] and I have provided their contact information as well.    Take the medications as directed.  Drink water to stay hydrated.    With regards to the pins and needle sensation in your throat, unclear if this truly represents an allergic reaction, but I would recommend that you mention the sensation in the future to any provider that recommends doing a CT scan with contrast.  Please return the emergency department immediately if the symptoms return, develop hives or rash on your body, difficulty breathing, swelling of your face, lips, tongue, or any other concerns.    Return to emergency department with worsening pain not controlled with your medications, fever, vomiting, or any other concerns.    Thank you for choosing [facility] Emergency Department.  It has been my pleasure caring for you today.     ~Dr. Lion MD

## 2023-07-02 NOTE — ED PROVIDER NOTES
"    EMERGENCY DEPARTMENT ENCOUNTER      NAME: Keshawn Jimenez  AGE: 32 year old male  YOB: 1990  MRN: [id number]  EVALUATION DATE & TIME: 2023  5:39 AM    PCP: No Ref-Primary, Physician    ED PROVIDER: Becca Seymour M.D.        Chief Complaint   Patient presents with     Flank Pain         FINAL IMPRESSION:    1. Right flank pain    2. Ureteral colic            MEDICAL DECISION MAKIN year old male with history of opiate dependence, and kidney stones who presents emergency department with right-sided flank pain.  Intermittent for the last week or more but became worse last night.  Associated urinary frequency with that.  CT scan shows 2 distal ureteral stones, largest being 3 mm on the right.  Additional nonobstructing left 5 mm stone also noted.  Patient has achieved good pain control here in the ER.  I did discuss with him and father present about pain control at home given his history of opiate dependence.  Both feel comfortable with him having a short course prescription for oxycodone to use if needed, but they understand the importance of using other medications including ibuprofen and Dramamine to help control his pain and spasms.  He would like information about the kidney stone [place] but also has seen [facility] in the past.  He has been provided contact information for both urology services.    After CT scan he did complain the left side of his throat felt a little \"pins-and-needles \"for a few minutes.  No other associated symptoms.  No objective findings or changes on exam.  This resolved on its own in a short period of time.  He has had CT scan with contrast in the past, 1 time in our system for sure.  At this time he did not require any intervention.  Patient is aware of this and will mention this in the future to any providers should he need repeat CT scan especially with contrast.  Nothing at this time to document true allergic reaction but certainly something " "to be aware of.        ED COURSE:  5:57 AM  I met with the patient to gather history and perform my exam. ED course and treatment discussed.    7:23 AM  I independently reviewed the CT scan looking for presence of a kidney stone and I do see what looks and a probable radiopaque stone on the right side down near the bladder.  I am awaiting official radiology report for confirmation or any other findings.    7:49 AM  Updated patient and father at bedside on the CT results including 2 kidney stones on the right and a nonobstructing on the left.  Sounds like he follows with [facility] for his kidney stones.  He also expressed some interest in possibly following up with the kidney stone [place].  Spoke with him about pain control for home use including opiates given his history of opiate abuse and currently on methadone.  Both the patient and father are comfortable with a prescription for oxycodone though they understand the importance of using this judiciously and the importance of using Dramamine and ibuprofen to help with pain and spasm.  Patient states that when he got back from CT scan the left side of his throat felt a little \"pins-and-needles \".  No swelling, no difficulty breathing, no difficulty speaking.  No rash.  This has now nearly resolved on its own.  Very short-lived.  I do not think he requires any emergent intervention at this time.  Unclear that this truly represents an allergic reaction but made patient and father aware that he should mention this anytime that he might have a CT scan in the future so that they can be aware that he did have that left sided throat pins and needle sensation for a few minutes (<30 min) about any objective signs for allergic reaction.  Chart review shows that he has had CT scan with contrast 1 other time in our health system without issue.    Does not appear toxic or septic.  No objective signs for allergic reaction.  Plan at this time is discharge home " prescriptions for pain control for kidney stones and have him follow-up with urology as an outpatient.  Contact information for [facility] has been provided to the patient as he has seen them in the past for kidney stones, but he also expressed interested information about kidney stone [place] and this was also provided.    He does not appear toxic or septic.  He understands what to watch for and when to return to the ER with back to both allergic reaction as well as kidney stone colic management.  All of his questions have been answered.  Father present at bedside and comfortable with plan as well.    I do not think that this represents ACS, PE, ruptured AAA, aortic dissection, bowel obstruction, bowel ischemia, cholecystitis, pancreatitis, appendicitis, diverticulitis, pyelonephritis, incarcerated or strangulated hernia, testicular torsion, viscus perforation, perforated GI ulcer, or other such etiologies at this time.    At the conclusion of the encounter I discussed the results of all of the tests and the disposition. Their questions were answered. The patient (and any family present) acknowledged understanding and were agreeable with the care plan.        CONSULTANTS:  Referral to \[facility] [place] [facility]\""        MEDICATIONS GIVEN IN THE EMERGENCY:  Medications   ketorolac (TORADOL) injection 15 mg (15 mg Intravenous $Given 7/2/23 0617)   dimenhyDRINATE chew tab 50 mg (50 mg Oral $Given 7/2/23 0616)   acetaminophen (TYLENOL) tablet 975 mg (975 mg Oral $Given 7/2/23 0616)   ondansetron (ZOFRAN) injection 4 mg (4 mg Intravenous $Given 7/2/23 0617)   0.9% sodium chloride BOLUS (0 mLs Intravenous Stopped 7/2/23 2268)   iopamidol (ISOVUE-370) solution 100 mL (91 mLs Intravenous $Given 7/2/23 0707)   ketorolac (TORADOL) injection 15 mg (15 mg Intravenous $Given 7/2/23 0726)           NEW PRESCRIPTIONS STARTED AT TODAY'S ER VISIT     Medication List      Started    acetaminophen 500 MG tablet  Commonly known as: TYLENOL  1,000 mg,  Oral, EVERY 6 HOURS     * dimenhyDRINATE 50 MG tablet  Commonly known as: DRAMAMINE  50 mg, Oral, AT BEDTIME     * dimenhyDRINATE 50 MG tablet  Commonly known as: DRAMAMINE  50 mg, Oral, EVERY 6 HOURS PRN     ibuprofen 200 MG tablet  Commonly known as: ADVIL/MOTRIN  400 mg, Oral, EVERY 6 HOURS     ondansetron 4 MG ODT tab  Commonly known as: ZOFRAN ODT  4 mg, Oral, EVERY 8 HOURS PRN     oxyCODONE 5 MG tablet  Commonly known as: ROXICODONE  5 mg, Oral, EVERY 4 HOURS PRN, If pain is not improved with acetaminophen and ibuprofen.         * This list has 2 medication(s) that are the same as other medications prescribed for you. Read the directions carefully, and ask your doctor or other care provider to review them with you.                    CONDITION:  Stable, improved        DISPOSITION:  D.c home with father         =================================================================  =================================================================  TRIAGE ASSESSMENT:  Pt here with right sided flank and back pain. States hx of stones. Started at 2100 and worsening. Has urgency and hesitancy urination,         ED Triage Vitals [07/02/23 0535]   Enc Vitals Group      BP (!) 141/96      Pulse 81      Resp 16      Temp 98.8  F (37.1  C)      Temp src Oral      SpO2 100 %      Weight 83.9 kg (185 lb)          ================================================================  ================================================================    HPI    Patient information was obtained from: patient    Use of Intrepreter: N/A     Keshawn Jimenez is a 32 year old male with history of kidney stones who presents to the ER with complaints of right flank pain.    Patient states that he has had intermittent right flank pain for the past 1-2 weeks.  He has experienced urinary frequency which is often the symptoms he will experience with his kidney stones.  Then overnight around 8 PM last night started experiencing worsening right  flank pain.  He took ibuprofen with last dose of 400 mg at 10 PM.  He has had some nausea with the pain otherwise denies any fevers, cough, chest pain, shortness of breath, vomiting or diarrhea.    He states that he has had interventions before for his kidney stones.    He denies any history of surgery.      REVIEW OF SYSTEMS  Review of Systems   Constitutional: Negative for fever.   Respiratory: Negative for cough and shortness of breath.    Cardiovascular: Negative for chest pain.   Gastrointestinal: Positive for abdominal pain and nausea. Negative for diarrhea and vomiting.   Genitourinary: Positive for flank pain and frequency. Negative for difficulty urinating.   Allergic/Immunologic: Negative for immunocompromised state.   All other systems reviewed and are negative.        PAST MEDICAL HISTORY:  Past Medical History:   Diagnosis Date     Chemical dependency (H)      Depressive disorder      History of amphetamine dependence/abuse (H)      History of heroin abuse (H)           Kidney stone     right         PAST SURGICAL HISTORY:  No past surgical history on file.      CURRENT MEDICATIONS:    Prior to Admission medications    Medication Sig Start Date End Date Taking? Authorizing Provider   METHADONE HCL PO Take 74 mg by mouth   Yes Reported, Patient         ALLERGIES:  No Known Allergies      FAMILY HISTORY:  Family History   Problem Relation Age of Onset     Depression Mother      Mental Illness Mother      Heart Disease Mother         pacemaker     Depression Maternal Aunt      Mental Illness Maternal Aunt      Mental Illness Paternal Uncle      Schizophrenia Paternal Uncle      Mental Illness Paternal Uncle      Schizophrenia Paternal Uncle      Aortic aneurysm Father      Hypertension Father          SOCIAL HISTORY:  Social History     Socioeconomic History     Marital status: Single   Tobacco Use     Smoking status: Every Day     Packs/day: 0.50     Years: 6.00     Pack years: 3.00     Types: Cigarettes      Smokeless tobacco: Never   Substance and Sexual Activity     Alcohol use: No     Drug use: No     Types: IV     Comment: Drug use: hx heroin abuse.  sober since 2015         VITALS:  Patient Vitals for the past 24 hrs:   BP Temp Temp src Pulse Resp SpO2 Weight   07/02/23 0730 117/75 -- -- 101 -- 97 % --   07/02/23 0700 114/72 -- -- 73 -- 95 % --   07/02/23 0645 113/71 -- -- 72 -- 94 % --   07/02/23 0630 123/83 -- -- 75 -- 93 % --   07/02/23 0622 136/88 -- -- 81 -- 96 % --   07/02/23 0535 (!) 141/96 98.8  F (37.1  C) Oral 81 16 100 % 83.9 kg (185 lb)       Wt Readings from Last 3 Encounters:   07/02/23 83.9 kg (185 lb)   08/18/21 77.1 kg (170 lb)   11/02/20 83.9 kg (185 lb)       CrCl cannot be calculated (Unknown ideal weight.).    PHYSICAL EXAM    Constitutional:  Well developed, Well nourished, GCS 15  HENT:  Normocephalic, Atraumatic, Bilateral external ears normal, Nose normal. Neck- Supple, No stridor.  Eyes:  PERRL, EOMI, Conjunctiva normal, No discharge.  Respiratory:  Normal breath sounds, No respiratory distress, No wheezing, Speaks full sentences easily. No cough.   Cardiovascular:  Normal heart rate, Regular rhythm, No rubs, No gallops.   GI:  No excessive obesity.  Bowel sounds normal, Soft, No tenderness, No masses, No flank tenderness. No rebound or guarding.   : deferred  Musculoskeletal: No cyanosis, No clubbing. Good range of motion in all major joints. No major deformities noted.   Integument:  Warm, Dry, No erythema, No rash.  No petechiae.   Neurologic:  Alert & oriented x 3, No obvious deficits appreciated. Normal gait.   Psychiatric:  Affect normal, Cooperative         LAB:  All pertinent labs reviewed and interpreted.  Recent Results (from the past 24 hour(s))   UA with Microscopic reflex to Culture    Collection Time: 07/02/23  5:47 AM    Specimen: Urine, Clean Catch   Result Value Ref Range    Color Urine Light Yellow Colorless, Straw, Light Yellow, Yellow    Appearance Urine Clear  Clear    Glucose Urine Negative Negative mg/dL    Bilirubin Urine Negative Negative    Ketones Urine Negative Negative mg/dL    Specific Gravity Urine 1.019 1.001 - 1.030    Blood Urine Negative Negative    pH Urine 5.5 5.0 - 7.0    Protein Albumin Urine Negative Negative mg/dL    Urobilinogen Urine <2.0 <2.0 mg/dL    Nitrite Urine Negative Negative    Leukocyte Esterase Urine Negative Negative    Mucus Urine Present (A) None Seen /LPF    RBC Urine 2 <=2 /HPF    WBC Urine 2 <=5 /HPF   Basic metabolic panel    Collection Time: 07/02/23  6:08 AM   Result Value Ref Range    Sodium 140 136 - 145 mmol/L    Potassium 3.7 3.5 - 5.0 mmol/L    Chloride 104 98 - 107 mmol/L    Carbon Dioxide (CO2) 23 22 - 31 mmol/L    Anion Gap 13 5 - 18 mmol/L    Urea Nitrogen 14 8 - 22 mg/dL    Creatinine 1.17 0.70 - 1.30 mg/dL    Calcium 9.7 8.5 - 10.5 mg/dL    Glucose 106 70 - 125 mg/dL    GFR Estimate 85 >60 mL/min/1.73m2   Extra Red Top Tube    Collection Time: 07/02/23  6:08 AM   Result Value Ref Range    Hold Specimen [facility]    CBC with platelets and differential    Collection Time: 07/02/23  6:08 AM   Result Value Ref Range    WBC Count 9.4 4.0 - 11.0 10e3/uL    RBC Count 5.45 4.40 - 5.90 10e6/uL    Hemoglobin 16.4 13.3 - 17.7 g/dL    Hematocrit 47.7 40.0 - 53.0 %    MCV 88 78 - 100 fL    MCH 30.1 26.5 - 33.0 pg    MCHC 34.4 31.5 - 36.5 g/dL    RDW 11.4 10.0 - 15.0 %    Platelet Count 232 150 - 450 10e3/uL    % Neutrophils 73 %    % Lymphocytes 18 %    % Monocytes 7 %    % Eosinophils 2 %    % Basophils 0 %    % Immature Granulocytes 0 %    NRBCs per 100 WBC 0 <1 /100    Absolute Neutrophils 6.8 1.6 - 8.3 10e3/uL    Absolute Lymphocytes 1.7 0.8 - 5.3 10e3/uL    Absolute Monocytes 0.7 0.0 - 1.3 10e3/uL    Absolute Eosinophils 0.2 0.0 - 0.7 10e3/uL    Absolute Basophils 0.0 0.0 - 0.2 10e3/uL    Absolute Immature Granulocytes 0.0 <=0.4 10e3/uL    Absolute NRBCs 0.0 10e3/uL       No results found for: [facility]        RADIOLOGY:  Reviewed all  pertinent imaging. Please see official radiology report.    CT Abdomen Pelvis w Contrast   Final Result   IMPRESSION:    1.  Two adjacent obstructing distal right ureteral stones with the larger measuring 3 mm. These are located 1 cm from the ureterovesical junction. Associated moderate right-sided hydroureteronephrosis.   2.  Nonobstructing 5 mm left renal stone.   3.  Hepatic steatosis.   4.  Small hiatal hernia.            EKG:    none    PROCEDURES:  none    Medical Decision Making    History:    Supplemental history from: Documented in chart, if applicable    External Record(s) reviewed: Documented in chart, if applicable.    Work Up:    Chart documentation includes differential considered and any EKGs or imaging independently interpreted by provider, where specified.    In additional to work up documented, I considered the following work up: Documented in chart, if applicable.    External consultation:    Discussion of management with another provider: Documented in chart, if applicable    Complicating factors:    Care impacted by chronic illness: Other: prior kidney stones    Care affected by social determinants of health: N/A    Disposition considerations: Discharge. I prescribed additional prescription strength medication(s) as charted. N/A.      Becca Seymour M.D. [facility]  Emergency Medicine and Medical Toxicology  [facility] EMERGENCY ROOM  [address] [id number] [phone]  Dept: [phone]           Becca Seymour MD  07/02/23 0807

## 2023-07-15 ENCOUNTER — HEALTH MAINTENANCE LETTER (OUTPATIENT)
Age: 33
End: 2023-07-15

## 2023-08-16 ENCOUNTER — ANCILLARY ORDERS (OUTPATIENT)
Dept: CT IMAGING | Facility: CLINIC | Age: 33
End: 2023-08-16

## 2023-08-16 DIAGNOSIS — N20.1 CALCULUS OF URETER: Primary | ICD-10-CM

## 2023-08-21 ENCOUNTER — HOSPITAL ENCOUNTER (OUTPATIENT)
Dept: CT IMAGING | Facility: CLINIC | Age: 33
Discharge: HOME | End: 2023-08-21
Attending: UROLOGY | Admitting: UROLOGY
Payer: COMMERCIAL

## 2023-08-21 DIAGNOSIS — N20.1 CALCULUS OF URETER: ICD-10-CM

## 2023-08-21 PROCEDURE — 74176 CT ABD & PELVIS W/O CONTRAST: CPT

## 2023-08-28 ENCOUNTER — TELEPHONE (OUTPATIENT)
Dept: FAMILY MEDICINE | Facility: CLINIC | Age: 33
End: 2023-08-28
Payer: COMMERCIAL

## 2023-08-28 NOTE — TELEPHONE ENCOUNTER
Appointment with Dr Robbins on 8/31 for preop. Please fax when completed. Bette Bansal, DIOR on 8/28/2023 at 5:22 PM

## 2023-08-28 NOTE — TELEPHONE ENCOUNTER
Forms Request    Name of form/paperwork: [facility] listing procedure to be done on 9/18/2023    Have you been seen for this request:  APPT on 8/31/23 with Dr. Robbins    Do we have the form: placed in Dr. Robbins mailbox    When is form needed by: after pre op complete    How would you like the form returned: fax [phone]     Patient Notified form requests are processed in 3-5 business days: na    Okay to leave a detailed message? na

## 2023-08-31 ENCOUNTER — OFFICE VISIT (OUTPATIENT)
Dept: FAMILY MEDICINE | Facility: CLINIC | Age: 33
End: 2023-08-31
Payer: COMMERCIAL

## 2023-08-31 VITALS
BODY MASS INDEX: 28.42 KG/M2 | WEIGHT: 176.8 LBS | HEART RATE: 73 BPM | RESPIRATION RATE: 14 BRPM | OXYGEN SATURATION: 95 % | TEMPERATURE: 98.7 F | DIASTOLIC BLOOD PRESSURE: 70 MMHG | SYSTOLIC BLOOD PRESSURE: 96 MMHG | HEIGHT: 66 IN

## 2023-08-31 DIAGNOSIS — Z01.818 PREOPERATIVE EXAMINATION: Primary | ICD-10-CM

## 2023-08-31 DIAGNOSIS — N20.0 NEPHROLITHIASIS: ICD-10-CM

## 2023-08-31 PROCEDURE — 99214 OFFICE O/P EST MOD 30 MIN: CPT | Performed by: FAMILY MEDICINE

## 2023-08-31 RX ORDER — OXYCODONE HYDROCHLORIDE 5 MG/1
5 TABLET ORAL EVERY 8 HOURS PRN
Qty: 15 TABLET | Refills: 0 | Status: ON HOLD | OUTPATIENT
Start: 2023-08-31 | End: 2023-10-09

## 2023-08-31 ASSESSMENT — PAIN SCALES - GENERAL: PAINLEVEL: NO PAIN (0)

## 2023-08-31 NOTE — PROGRESS NOTES
[facility]  [address], [facility] [address]  [place] [facility] [address]  Phone: [phone]  Fax: [phone]  Primary Provider: No Ref-Primary, Physician  Pre-op Performing Provider: KLAUDIA EASLEY      PREOPERATIVE EVALUATION:  Today's date: 8/31/2023    Keshawn Jimenez is a 32 year old male who presents for a preoperative evaluation.      8/31/2023     9:18 AM   Additional Questions   Roomed by Vickie CANTU CMA       Surgical Information:  Surgery/Procedure: Kidney Stones  Surgery Location: [facility]  Surgeon: Dr. Cerda  Surgery Date: 9/18/23  Time of Surgery: 9:20  Where patient plans to recover: At home with family  Fax number for surgical facility: [phone]    Assessment & Plan     The proposed surgical procedure is considered LOW risk.    Problem List Items Addressed This Visit    None  Visit Diagnoses       Preoperative examination    -  Primary    Nephrolithiasis        Relevant Medications    oxyCODONE (ROXICODONE) 5 MG tablet                    - No identified additional risk factors other than previously addressed        RECOMMENDATION:  APPROVAL GIVEN to proceed with proposed procedure, without further diagnostic evaluation.            Subjective       HPI related to upcoming procedure:          8/31/2023     9:14 AM   Preop Questions   1. Have you ever had a heart attack or stroke? No   2. Have you ever had surgery on your heart or blood vessels, such as a stent placement, a coronary artery bypass, or surgery on an artery in your head, neck, heart, or legs? No   3. Do you have chest pain with activity? No   4. Do you have a history of  heart failure? No   5. Do you currently have a cold, bronchitis or symptoms of other infection? YES - cold symptoms.   No fever   6. Do you have a cough, shortness of breath, or wheezing? YES - with cold   7. Do you or anyone in your family have previous history of blood clots? YES -     8. Do you  or does anyone in your family have a serious bleeding problem such as prolonged bleeding following surgeries or cuts? YES -     9. Have you ever had problems with anemia or been told to take iron pills? No   10. Have you had any abnormal blood loss such as black, tarry or bloody stools? No   11. Have you ever had a blood transfusion? No   12. Are you willing to have a blood transfusion if it is medically needed before, during, or after your surgery? Yes   13. Have you or any of your relatives ever had problems with anesthesia? No   14. Do you have sleep apnea, excessive snoring or daytime drowsiness? No   15. Do you have any artifical heart valves or other implanted medical devices like a pacemaker, defibrillator, or continuous glucose monitor? No   16. Do you have artificial joints? No   17. Are you allergic to latex? No         Preoperative Review of :   reviewed - controlled substances prescribed by other outside provider(s).          Review of Systems  CONSTITUTIONAL: NEGATIVE for fever, chills, change in weight  ENT/MOUTH: NEGATIVE for ear, mouth and throat problems  RESP: NEGATIVE for significant cough or SOB  CV: NEGATIVE for chest pain, palpitations or peripheral edema    Patient Active Problem List    Diagnosis Date Noted    Long-term current use of methadone for opiate dependence (H) 11/15/2016     Priority: Medium    History of heroin abuse (H)      Priority: Medium    History of amphetamine dependence/abuse (H)      Priority: Medium    Anxiety      Priority: Medium     Created by Conversion  Replacement Utility updated for latest IMO load        Allergic Rhinitis      Priority: Medium     Created by Conversion  Replacement Utility updated for latest IMO load        Chronic Constipation      Priority: Medium     Created by Conversion  Replacement Utility updated for latest IMO load        Abdominal Pain In The Right Lower Belly (RLQ)      Priority: Medium     Created by Conversion        Lower Back  "Pain      Priority: Medium     Created by Conversion          Past Medical History:   Diagnosis Date    Chemical dependency (H)     Depressive disorder     History of amphetamine dependence/abuse (H)     History of heroin abuse (H)          Kidney stone     right     No past surgical history on file.  Current Outpatient Medications   Medication Sig Dispense Refill    METHADONE HCL PO Take 74 mg by mouth (Patient not taking: Reported on 8/31/2023)      ondansetron (ZOFRAN ODT) 4 MG ODT tab Take 1 tablet (4 mg) by mouth every 8 hours as needed for nausea (Patient not taking: Reported on 8/31/2023) 12 tablet 0       No Known Allergies     Social History     Tobacco Use    Smoking status: Every Day     Packs/day: 0.50     Years: 6.00     Pack years: 3.00     Types: Cigarettes     Passive exposure: Current    Smokeless tobacco: Never   Substance Use Topics    Alcohol use: No       History   Drug Use No     Comment: Drug use: hx heroin abuse.  sober since 2015         Objective     BP 96/70 (BP Location: Left arm, Patient Position: Sitting, Cuff Size: Adult Regular)   Pulse 73   Temp 98.7  F (37.1  C) (Oral)   Resp 14   Ht 1.664 m (5' 5.5\")   Wt 80.2 kg (176 lb 12.8 oz)   SpO2 95%   BMI 28.97 kg/m      Physical Exam    GENERAL APPEARANCE: healthy, alert and no distress     EYES: EOMI,  PERRL     HENT: ear canals and TM's normal and nose and mouth without ulcers or lesions     NECK: no adenopathy, no asymmetry, masses, or scars and thyroid normal to palpation     RESP: lungs clear to auscultation - no rales, rhonchi or wheezes     CV: regular rates and rhythm, normal S1 S2, no S3 or S4 and no murmur, click or rub     ABDOMEN:  soft, nontender, no HSM or masses and bowel sounds normal     MS: extremities normal- no gross deformities noted, no evidence of inflammation in joints, FROM in all extremities.     SKIN: no suspicious lesions or rashes     NEURO: Normal strength and tone, sensory exam grossly normal, " mentation intact and speech normal     PSYCH: mentation appears normal. and affect normal/bright     LYMPHATICS: No cervical adenopathy    Recent Labs   Lab Test 07/02/23  0608   HGB 16.4         POTASSIUM 3.7   CR 1.17        Diagnostics:  No labs were ordered during this visit.   No EKG required, no history of coronary heart disease, significant arrhythmia, peripheral arterial disease or other structural heart disease.    Revised Cardiac Risk Index (RCRI):  The patient has the following serious cardiovascular risks for perioperative complications:   - No serious cardiac risks = 0 points     RCRI Interpretation: 0 points: Class I (very low risk - 0.4% complication rate)         Signed Electronically by: KLAUDIA EASLEY MD  Copy of this evaluation report is provided to requesting physician.

## 2023-08-31 NOTE — TELEPHONE ENCOUNTER
Form informative only. Fax number transferred from form to pre-op note. Will fax when completed. See visit from 8/31/23.    Vickie Pelayo CMA.

## 2023-10-08 ENCOUNTER — APPOINTMENT (OUTPATIENT)
Dept: CT IMAGING | Facility: CLINIC | Age: 33
End: 2023-10-08
Payer: COMMERCIAL

## 2023-10-08 ENCOUNTER — HOSPITAL ENCOUNTER (OUTPATIENT)
Facility: CLINIC | Age: 33
Setting detail: OBSERVATION
Discharge: HOME | End: 2023-10-09
Attending: EMERGENCY MEDICINE | Admitting: INTERNAL MEDICINE
Payer: COMMERCIAL

## 2023-10-08 DIAGNOSIS — Z96.0 URETERAL STENT PRESENT: ICD-10-CM

## 2023-10-08 DIAGNOSIS — N39.0 UTI (URINARY TRACT INFECTION): ICD-10-CM

## 2023-10-08 DIAGNOSIS — N30.01 ACUTE CYSTITIS WITH HEMATURIA: Primary | ICD-10-CM

## 2023-10-08 DIAGNOSIS — N13.30 HYDRONEPHROSIS, UNSPECIFIED HYDRONEPHROSIS TYPE: ICD-10-CM

## 2023-10-08 LAB
ALBUMIN UR-MCNC: 200 MG/DL
ANION GAP SERPL CALCULATED.3IONS-SCNC: 10 MMOL/L (ref 7–15)
APPEARANCE UR: ABNORMAL
BASO+EOS+MONOS # BLD AUTO: NORMAL 10*3/UL
BASO+EOS+MONOS NFR BLD AUTO: NORMAL %
BASOPHILS # BLD AUTO: 0.1 10E3/UL (ref 0–0.2)
BASOPHILS NFR BLD AUTO: 1 %
BILIRUB UR QL STRIP: NEGATIVE
BUN SERPL-MCNC: 12.5 MG/DL (ref 6–20)
CALCIUM SERPL-MCNC: 9.5 MG/DL (ref 8.6–10)
CHLORIDE SERPL-SCNC: 104 MMOL/L (ref 98–107)
COLOR UR AUTO: ABNORMAL
CREAT SERPL-MCNC: 1.17 MG/DL (ref 0.67–1.17)
DEPRECATED HCO3 PLAS-SCNC: 30 MMOL/L (ref 22–29)
EGFRCR SERPLBLD CKD-EPI 2021: 85 ML/MIN/1.73M2
EOSINOPHIL # BLD AUTO: 0.1 10E3/UL (ref 0–0.7)
EOSINOPHIL NFR BLD AUTO: 1 %
ERYTHROCYTE [DISTWIDTH] IN BLOOD BY AUTOMATED COUNT: 12.3 % (ref 10–15)
GLUCOSE SERPL-MCNC: 104 MG/DL (ref 70–99)
GLUCOSE UR STRIP-MCNC: 30 MG/DL
HCT VFR BLD AUTO: 47 % (ref 40–53)
HGB BLD-MCNC: 15.8 G/DL (ref 13.3–17.7)
HGB UR QL STRIP: ABNORMAL
HYALINE CASTS: 12 /LPF
IMM GRANULOCYTES # BLD: 0 10E3/UL
IMM GRANULOCYTES NFR BLD: 0 %
KETONES UR STRIP-MCNC: NEGATIVE MG/DL
LEUKOCYTE ESTERASE UR QL STRIP: ABNORMAL
LYMPHOCYTES # BLD AUTO: 1.1 10E3/UL (ref 0.8–5.3)
LYMPHOCYTES NFR BLD AUTO: 15 %
MCH RBC QN AUTO: 30.2 PG (ref 26.5–33)
MCHC RBC AUTO-ENTMCNC: 33.6 G/DL (ref 31.5–36.5)
MCV RBC AUTO: 90 FL (ref 78–100)
MONOCYTES # BLD AUTO: 0.4 10E3/UL (ref 0–1.3)
MONOCYTES NFR BLD AUTO: 6 %
MUCOUS THREADS #/AREA URNS LPF: PRESENT /LPF
NEUTROPHILS # BLD AUTO: 5.6 10E3/UL (ref 1.6–8.3)
NEUTROPHILS NFR BLD AUTO: 77 %
NITRATE UR QL: NEGATIVE
NRBC # BLD AUTO: 0 10E3/UL
NRBC BLD AUTO-RTO: 0 /100
PH UR STRIP: 6 [PH] (ref 5–7)
PLATELET # BLD AUTO: 237 10E3/UL (ref 150–450)
POTASSIUM SERPL-SCNC: 3.6 MMOL/L (ref 3.4–5.3)
RBC # BLD AUTO: 5.23 10E6/UL (ref 4.4–5.9)
RBC URINE: >182 /HPF
SODIUM SERPL-SCNC: 144 MMOL/L (ref 135–145)
SP GR UR STRIP: 1.02 (ref 1–1.03)
SQUAMOUS EPITHELIAL: 4 /HPF
UROBILINOGEN UR STRIP-MCNC: <2 MG/DL
WBC # BLD AUTO: 7.3 10E3/UL (ref 4–11)
WBC URINE: >182 /HPF

## 2023-10-08 PROCEDURE — 87086 URINE CULTURE/COLONY COUNT: CPT

## 2023-10-08 PROCEDURE — 74176 CT ABD & PELVIS W/O CONTRAST: CPT

## 2023-10-08 PROCEDURE — 99285 EMERGENCY DEPT VISIT HI MDM: CPT | Mod: 25

## 2023-10-08 PROCEDURE — 258N000003 HC RX IP 258 OP 636

## 2023-10-08 PROCEDURE — 80048 BASIC METABOLIC PNL TOTAL CA: CPT

## 2023-10-08 PROCEDURE — 85025 COMPLETE CBC W/AUTO DIFF WBC: CPT

## 2023-10-08 PROCEDURE — 96365 THER/PROPH/DIAG IV INF INIT: CPT

## 2023-10-08 PROCEDURE — 96375 TX/PRO/DX INJ NEW DRUG ADDON: CPT

## 2023-10-08 PROCEDURE — 96361 HYDRATE IV INFUSION ADD-ON: CPT

## 2023-10-08 PROCEDURE — 250N000011 HC RX IP 250 OP 636: Mod: JZ

## 2023-10-08 PROCEDURE — 36415 COLL VENOUS BLD VENIPUNCTURE: CPT

## 2023-10-08 PROCEDURE — 81001 URINALYSIS AUTO W/SCOPE: CPT

## 2023-10-08 PROCEDURE — 96376 TX/PRO/DX INJ SAME DRUG ADON: CPT

## 2023-10-08 RX ORDER — HYDROMORPHONE HYDROCHLORIDE 1 MG/ML
0.5 INJECTION, SOLUTION INTRAMUSCULAR; INTRAVENOUS; SUBCUTANEOUS ONCE
Status: COMPLETED | OUTPATIENT
Start: 2023-10-08 | End: 2023-10-08

## 2023-10-08 RX ORDER — CEFTRIAXONE 2 G/1
2 INJECTION, POWDER, FOR SOLUTION INTRAMUSCULAR; INTRAVENOUS ONCE
Status: COMPLETED | OUTPATIENT
Start: 2023-10-08 | End: 2023-10-09

## 2023-10-08 RX ADMIN — SODIUM CHLORIDE 1000 ML: 9 INJECTION, SOLUTION INTRAVENOUS at 21:07

## 2023-10-08 RX ADMIN — HYDROMORPHONE HYDROCHLORIDE 0.5 MG: 1 INJECTION, SOLUTION INTRAMUSCULAR; INTRAVENOUS; SUBCUTANEOUS at 23:25

## 2023-10-08 RX ADMIN — HYDROMORPHONE HYDROCHLORIDE 0.5 MG: 1 INJECTION, SOLUTION INTRAMUSCULAR; INTRAVENOUS; SUBCUTANEOUS at 21:07

## 2023-10-08 RX ADMIN — CEFTRIAXONE SODIUM 2 G: 2 INJECTION, POWDER, FOR SOLUTION INTRAMUSCULAR; INTRAVENOUS at 23:24

## 2023-10-08 ASSESSMENT — ENCOUNTER SYMPTOMS
DYSURIA: 0
VOMITING: 0
CHILLS: 0
FEVER: 0
HEMATURIA: 1
SHORTNESS OF BREATH: 0
NAUSEA: 0

## 2023-10-08 ASSESSMENT — ACTIVITIES OF DAILY LIVING (ADL)
ADLS_ACUITY_SCORE: 35
ADLS_ACUITY_SCORE: 33

## 2023-10-09 ENCOUNTER — APPOINTMENT (OUTPATIENT)
Dept: ULTRASOUND IMAGING | Facility: CLINIC | Age: 33
End: 2023-10-09
Payer: COMMERCIAL

## 2023-10-09 VITALS
OXYGEN SATURATION: 97 % | HEIGHT: 65 IN | RESPIRATION RATE: 16 BRPM | HEART RATE: 76 BPM | SYSTOLIC BLOOD PRESSURE: 122 MMHG | BODY MASS INDEX: 27.99 KG/M2 | TEMPERATURE: 98.1 F | DIASTOLIC BLOOD PRESSURE: 85 MMHG | WEIGHT: 168 LBS

## 2023-10-09 PROBLEM — N13.30 HYDRONEPHROSIS, UNSPECIFIED HYDRONEPHROSIS TYPE: Status: ACTIVE | Noted: 2023-10-09

## 2023-10-09 PROBLEM — N39.0 UTI (URINARY TRACT INFECTION): Status: ACTIVE | Noted: 2023-10-09

## 2023-10-09 PROBLEM — Z96.0 URETERAL STENT PRESENT: Status: ACTIVE | Noted: 2023-10-09

## 2023-10-09 PROBLEM — N30.01 ACUTE CYSTITIS WITH HEMATURIA: Status: ACTIVE | Noted: 2023-10-09

## 2023-10-09 LAB
ALBUMIN SERPL BCG-MCNC: 3.8 G/DL (ref 3.5–5.2)
ALP SERPL-CCNC: 44 U/L (ref 40–129)
ALT SERPL W P-5'-P-CCNC: 19 U/L (ref 0–70)
ANION GAP SERPL CALCULATED.3IONS-SCNC: 11 MMOL/L (ref 7–15)
AST SERPL W P-5'-P-CCNC: 23 U/L (ref 0–45)
BASO+EOS+MONOS # BLD AUTO: NORMAL 10*3/UL
BASO+EOS+MONOS NFR BLD AUTO: NORMAL %
BASOPHILS # BLD AUTO: 0 10E3/UL (ref 0–0.2)
BASOPHILS NFR BLD AUTO: 1 %
BILIRUB SERPL-MCNC: 0.4 MG/DL
BUN SERPL-MCNC: 7.2 MG/DL (ref 6–20)
CALCIUM SERPL-MCNC: 9 MG/DL (ref 8.6–10)
CALCIUM UR-MCNC: 7.4 MG/DL
CALCIUM/CREAT UR: 0.24 G/G CR (ref 0.05–0.27)
CHLORIDE SERPL-SCNC: 106 MMOL/L (ref 98–107)
CREAT SERPL-MCNC: 0.95 MG/DL (ref 0.67–1.17)
CREAT UR-MCNC: 31.3 MG/DL
DEPRECATED HCO3 PLAS-SCNC: 28 MMOL/L (ref 22–29)
EGFRCR SERPLBLD CKD-EPI 2021: >90 ML/MIN/1.73M2
EOSINOPHIL # BLD AUTO: 0.2 10E3/UL (ref 0–0.7)
EOSINOPHIL NFR BLD AUTO: 4 %
ERYTHROCYTE [DISTWIDTH] IN BLOOD BY AUTOMATED COUNT: 12.4 % (ref 10–15)
GLUCOSE SERPL-MCNC: 94 MG/DL (ref 70–99)
HCT VFR BLD AUTO: 45.4 % (ref 40–53)
HGB BLD-MCNC: 15.2 G/DL (ref 13.3–17.7)
IMM GRANULOCYTES # BLD: 0 10E3/UL
IMM GRANULOCYTES NFR BLD: 0 %
LYMPHOCYTES # BLD AUTO: 2 10E3/UL (ref 0.8–5.3)
LYMPHOCYTES NFR BLD AUTO: 39 %
MCH RBC QN AUTO: 30.4 PG (ref 26.5–33)
MCHC RBC AUTO-ENTMCNC: 33.5 G/DL (ref 31.5–36.5)
MCV RBC AUTO: 91 FL (ref 78–100)
MONOCYTES # BLD AUTO: 0.4 10E3/UL (ref 0–1.3)
MONOCYTES NFR BLD AUTO: 7 %
NEUTROPHILS # BLD AUTO: 2.6 10E3/UL (ref 1.6–8.3)
NEUTROPHILS NFR BLD AUTO: 49 %
NRBC # BLD AUTO: 0 10E3/UL
NRBC BLD AUTO-RTO: 0 /100
PLATELET # BLD AUTO: 200 10E3/UL (ref 150–450)
POTASSIUM SERPL-SCNC: 4.2 MMOL/L (ref 3.4–5.3)
PROT SERPL-MCNC: 6.8 G/DL (ref 6.4–8.3)
PTH-INTACT SERPL-MCNC: 52 PG/ML (ref 15–65)
RBC # BLD AUTO: 5 10E6/UL (ref 4.4–5.9)
SODIUM SERPL-SCNC: 145 MMOL/L (ref 135–145)
VIT D+METAB SERPL-MCNC: 8 NG/ML (ref 20–50)
WBC # BLD AUTO: 5.2 10E3/UL (ref 4–11)

## 2023-10-09 PROCEDURE — 80053 COMPREHEN METABOLIC PANEL: CPT | Performed by: INTERNAL MEDICINE

## 2023-10-09 PROCEDURE — 250N000013 HC RX MED GY IP 250 OP 250 PS 637: Performed by: INTERNAL MEDICINE

## 2023-10-09 PROCEDURE — 36415 COLL VENOUS BLD VENIPUNCTURE: CPT | Performed by: INTERNAL MEDICINE

## 2023-10-09 PROCEDURE — G0378 HOSPITAL OBSERVATION PER HR: HCPCS

## 2023-10-09 PROCEDURE — 82340 ASSAY OF CALCIUM IN URINE: CPT | Performed by: STUDENT IN AN ORGANIZED HEALTH CARE EDUCATION/TRAINING PROGRAM

## 2023-10-09 PROCEDURE — 83970 ASSAY OF PARATHORMONE: CPT | Performed by: STUDENT IN AN ORGANIZED HEALTH CARE EDUCATION/TRAINING PROGRAM

## 2023-10-09 PROCEDURE — 82306 VITAMIN D 25 HYDROXY: CPT | Performed by: STUDENT IN AN ORGANIZED HEALTH CARE EDUCATION/TRAINING PROGRAM

## 2023-10-09 PROCEDURE — 99207 PR APP CREDIT; MD BILLING SHARED VISIT: CPT | Performed by: STUDENT IN AN ORGANIZED HEALTH CARE EDUCATION/TRAINING PROGRAM

## 2023-10-09 PROCEDURE — 99236 HOSP IP/OBS SAME DATE HI 85: CPT | Performed by: INTERNAL MEDICINE

## 2023-10-09 PROCEDURE — 85025 COMPLETE CBC W/AUTO DIFF WBC: CPT | Performed by: INTERNAL MEDICINE

## 2023-10-09 PROCEDURE — 76770 US EXAM ABDO BACK WALL COMP: CPT

## 2023-10-09 PROCEDURE — 258N000003 HC RX IP 258 OP 636: Performed by: INTERNAL MEDICINE

## 2023-10-09 RX ORDER — CEFDINIR 300 MG/1
300 CAPSULE ORAL 2 TIMES DAILY
Qty: 20 CAPSULE | Refills: 0 | Status: CANCELLED | OUTPATIENT
Start: 2023-10-09

## 2023-10-09 RX ORDER — ACETAMINOPHEN 650 MG/1
650 SUPPOSITORY RECTAL EVERY 6 HOURS PRN
Status: DISCONTINUED | OUTPATIENT
Start: 2023-10-09 | End: 2023-10-09

## 2023-10-09 RX ORDER — ACETAMINOPHEN 500 MG
500-1000 TABLET ORAL EVERY 6 HOURS PRN
Status: ON HOLD | COMMUNITY
End: 2023-10-09

## 2023-10-09 RX ORDER — METHADONE HYDROCHLORIDE 5 MG/5ML
74 SOLUTION ORAL DAILY
Status: ON HOLD | COMMUNITY
End: 2023-10-09

## 2023-10-09 RX ORDER — ONDANSETRON 2 MG/ML
4 INJECTION INTRAMUSCULAR; INTRAVENOUS EVERY 6 HOURS PRN
Status: DISCONTINUED | OUTPATIENT
Start: 2023-10-09 | End: 2023-10-09 | Stop reason: HOSPADM

## 2023-10-09 RX ORDER — METHADONE HYDROCHLORIDE 10 MG/5ML
74 SOLUTION ORAL DAILY
COMMUNITY
End: 2024-02-15

## 2023-10-09 RX ORDER — METHADONE HYDROCHLORIDE 10 MG/ML
74 CONCENTRATE ORAL DAILY
Status: DISCONTINUED | OUTPATIENT
Start: 2023-10-10 | End: 2023-10-09 | Stop reason: HOSPADM

## 2023-10-09 RX ORDER — ONDANSETRON 4 MG/1
4 TABLET, ORALLY DISINTEGRATING ORAL EVERY 6 HOURS PRN
Status: DISCONTINUED | OUTPATIENT
Start: 2023-10-09 | End: 2023-10-09 | Stop reason: HOSPADM

## 2023-10-09 RX ORDER — OXYCODONE AND ACETAMINOPHEN 5; 325 MG/1; MG/1
1 TABLET ORAL EVERY 6 HOURS PRN
Qty: 20 TABLET | Refills: 0 | Status: SHIPPED | OUTPATIENT
Start: 2023-10-09 | End: 2023-10-14

## 2023-10-09 RX ORDER — IBUPROFEN 200 MG
400 TABLET ORAL EVERY 6 HOURS PRN
Qty: 30 TABLET | Refills: 0 | Status: CANCELLED | OUTPATIENT
Start: 2023-10-09

## 2023-10-09 RX ORDER — IBUPROFEN 200 MG
400 TABLET ORAL EVERY 6 HOURS PRN
Qty: 30 TABLET | Refills: 0 | Status: SHIPPED | OUTPATIENT
Start: 2023-10-09 | End: 2024-02-15

## 2023-10-09 RX ORDER — CEFDINIR 300 MG/1
300 CAPSULE ORAL 2 TIMES DAILY
Qty: 20 CAPSULE | Refills: 0 | Status: SHIPPED | OUTPATIENT
Start: 2023-10-09 | End: 2024-02-15

## 2023-10-09 RX ORDER — NALOXONE HYDROCHLORIDE 0.4 MG/ML
0.2 INJECTION, SOLUTION INTRAMUSCULAR; INTRAVENOUS; SUBCUTANEOUS
Status: DISCONTINUED | OUTPATIENT
Start: 2023-10-09 | End: 2023-10-09 | Stop reason: HOSPADM

## 2023-10-09 RX ORDER — CEFTRIAXONE 2 G/1
2 INJECTION, POWDER, FOR SOLUTION INTRAMUSCULAR; INTRAVENOUS EVERY 24 HOURS
Status: DISCONTINUED | OUTPATIENT
Start: 2023-10-09 | End: 2023-10-09

## 2023-10-09 RX ORDER — ACETAMINOPHEN 325 MG/1
650 TABLET ORAL EVERY 4 HOURS PRN
Status: DISCONTINUED | OUTPATIENT
Start: 2023-10-09 | End: 2023-10-09

## 2023-10-09 RX ORDER — NALOXONE HYDROCHLORIDE 0.4 MG/ML
0.4 INJECTION, SOLUTION INTRAMUSCULAR; INTRAVENOUS; SUBCUTANEOUS
Status: DISCONTINUED | OUTPATIENT
Start: 2023-10-09 | End: 2023-10-09 | Stop reason: HOSPADM

## 2023-10-09 RX ORDER — CEFTRIAXONE 1 G/1
1 INJECTION, POWDER, FOR SOLUTION INTRAMUSCULAR; INTRAVENOUS EVERY 24 HOURS
Status: DISCONTINUED | OUTPATIENT
Start: 2023-10-09 | End: 2023-10-09 | Stop reason: HOSPADM

## 2023-10-09 RX ORDER — HYDROCODONE BITARTRATE AND ACETAMINOPHEN 5; 325 MG/1; MG/1
1-2 TABLET ORAL EVERY 4 HOURS PRN
Status: DISCONTINUED | OUTPATIENT
Start: 2023-10-09 | End: 2023-10-09

## 2023-10-09 RX ORDER — ACETAMINOPHEN 325 MG/1
975 TABLET ORAL EVERY 8 HOURS
Status: DISCONTINUED | OUTPATIENT
Start: 2023-10-09 | End: 2023-10-09 | Stop reason: HOSPADM

## 2023-10-09 RX ORDER — IBUPROFEN 200 MG
200 TABLET ORAL EVERY 4 HOURS PRN
Status: ON HOLD | COMMUNITY
End: 2023-10-09

## 2023-10-09 RX ORDER — OXYCODONE HYDROCHLORIDE 5 MG/1
5 TABLET ORAL EVERY 4 HOURS PRN
Status: DISCONTINUED | OUTPATIENT
Start: 2023-10-09 | End: 2023-10-09 | Stop reason: HOSPADM

## 2023-10-09 RX ORDER — SODIUM CHLORIDE 9 MG/ML
INJECTION, SOLUTION INTRAVENOUS CONTINUOUS
Status: DISCONTINUED | OUTPATIENT
Start: 2023-10-09 | End: 2023-10-09 | Stop reason: HOSPADM

## 2023-10-09 RX ORDER — OXYCODONE AND ACETAMINOPHEN 5; 325 MG/1; MG/1
1 TABLET ORAL EVERY 6 HOURS PRN
Qty: 20 TABLET | Refills: 0 | Status: CANCELLED | OUTPATIENT
Start: 2023-10-09 | End: 2023-10-14

## 2023-10-09 RX ADMIN — ACETAMINOPHEN 650 MG: 325 TABLET ORAL at 06:26

## 2023-10-09 RX ADMIN — SODIUM CHLORIDE: 9 INJECTION, SOLUTION INTRAVENOUS at 02:24

## 2023-10-09 ASSESSMENT — ACTIVITIES OF DAILY LIVING (ADL)
ADLS_ACUITY_SCORE: 20
VISION_MANAGEMENT: GLASSES
ADLS_ACUITY_SCORE: 20
FALL_HISTORY_WITHIN_LAST_SIX_MONTHS: NO
TOILETING_ISSUES: NO
ADLS_ACUITY_SCORE: 20
ADLS_ACUITY_SCORE: 20
CHANGE_IN_FUNCTIONAL_STATUS_SINCE_ONSET_OF_CURRENT_ILLNESS/INJURY: NO
ADLS_ACUITY_SCORE: 20
DRESSING/BATHING_DIFFICULTY: NO
ADLS_ACUITY_SCORE: 35
DIFFICULTY_EATING/SWALLOWING: NO
WEAR_GLASSES_OR_BLIND: YES
CONCENTRATING,_REMEMBERING_OR_MAKING_DECISIONS_DIFFICULTY: NO
WALKING_OR_CLIMBING_STAIRS_DIFFICULTY: NO
DOING_ERRANDS_INDEPENDENTLY_DIFFICULTY: NO
ADLS_ACUITY_SCORE: 20

## 2023-10-09 NOTE — H&P
[facility] MEDICINE ADMISSION HISTORY AND PHYSICAL       Assessment & Plan      1. UTI, with history of renal stones ---  Last 9/18 - urology procedures were done for Right ureteral stone and Left UPJ stone. And had Bilateral ureteral stents in place.    CT on admission -- moderate dilatation of the right renal collecting system and mild dilatation of the left renal collecting system.     - IVF, NPO, anti emetics, pain meds, and antibiotics  - Urology ref  - CBC/CMP in AM    2. History of depression, chemical dependency  - PTA meds   - on liquid methadone 72 mgs daily from [facility] -- dose today received       VTE prophylaxis --  SCDs, may add SQH if safe or appropriate  Diet -- NPO  Code Status -- Full,  Barriers to discharge -- Admitting/Acute medical condition/s  Discharge Disposition and goals --  Unable to determine at this point, pending progress/treatment response.     PPE - I was wearing PPE including but not limited to - N95 mask, Gloves, and/or Safety glasses.  Admission Status -- Observation, Inpatient     Care plan is based on available information and patient's condition at the time of encounter. This was discussed with the patient/family member. They agree to proceed. They were made aware that care plans may change.     To the oni MD, I recommend to revise care plan/review history if condition changed or new information came up not available during my encounter. This case will be presented to the rounding MD after my shift. All or some of home medication/s were not resumed on admission due to safety reasons or contraindications. Dosing and frequency may also have been modified. Please resume/review them during your visit.     75 minutes spent by me on the date of service doing chart review, history, exam, diagnostic test results interpretation, documentation & further activities per the note.        Attila Gracia MD, MPH, FACP, [facility]  Internal Medicine -  Hospitalist        Chief Complaint Abdominal pain      HISTORY     - Patient is in ED -  9. He is here for lower abdominal pain and back pain. No fever at this point. He denies urinary symptoms. No diarrhea.     - Last 9/18/2023 -- had urologic procedures for -- Right ureteral stone and Left UPJ stone  - Right ureteroscopy with holmium laser lithotripsy and basket stone extraction; Left ureteroscopy with ureteral dilation using fluoroscopy; Bilateral ureteral stent placement       - ED course - CT   1.  Bilateral ureteral stents in place. There is moderate dilatation of the right renal collecting system and mild dilatation of the left renal collecting system. The urinary bladder is moderately distended. 2.  Few bilateral intrarenal stones.    - Rocephin was given for UTI. WBC is normal. He was referred to urology.     - ROS --- No headache. No dizziness. No weakness. No CP or SOB. No palpitations. No urinary symptoms. No bleeding symptoms. No weight loss. Rest of 12 point ROS was reviewed and negative.       Past Medical History     Past Medical History:   Diagnosis Date    Chemical dependency (H)     Depressive disorder     History of amphetamine dependence/abuse (H)     History of heroin abuse (H)          Kidney stone     right         Surgical History     History reviewed. No pertinent surgical history.     Family History      Family History   Problem Relation Age of Onset    Depression Mother     Mental Illness Mother     Heart Disease Mother         pacemaker    Depression Maternal Aunt     Mental Illness Maternal Aunt     Mental Illness Paternal Uncle     Schizophrenia Paternal Uncle     Mental Illness Paternal Uncle     Schizophrenia Paternal Uncle     Aortic aneurysm Father     Hypertension Father          Social History      .  Social History     Socioeconomic History    Marital status: Single     Spouse name: Not on file    Number of children: Not on file    Years of education: Not on file    Highest  "education level: Not on file   Occupational History    Not on file   Tobacco Use    Smoking status: Every Day     Packs/day: 0.50     Years: 6.00     Pack years: 3.00     Types: Cigarettes     Passive exposure: Current    Smokeless tobacco: Never   Vaping Use    Vaping Use: Never used   Substance and Sexual Activity    Alcohol use: No    Drug use: No     Types: IV     Comment: Drug use: hx heroin abuse.  sober since 2015    Sexual activity: Not on file   Other Topics Concern    Not on file   Social History Narrative    Not on file     Social Determinants of Health     Financial Resource Strain: Not on file   Food Insecurity: Not on file   Transportation Needs: Not on file   Physical Activity: Not on file   Stress: Not on file   Social Connections: Not on file   Interpersonal Safety: Not on file   Housing Stability: Not on file          Allergies        Allergies   Allergen Reactions    Iodinated Contrast Media Other (See Comments)     Had CT for kidney stones 7/2/2023 and had throat itching (Isovue)         Prior to Admission Medications      No current facility-administered medications on file prior to encounter.  oxyCODONE (ROXICODONE) 5 MG tablet, Take 1 tablet (5 mg) by mouth every 8 hours as needed for moderate to severe pain            Review of Systems     A 12 point comprehensive review of systems was negative except as noted above in HPI.    PHYSICAL EXAMINATION       Vitals      Vitals: BP (!) 126/90   Pulse 101   Temp 99.1  F (37.3  C) (Oral)   Resp 20   Ht 1.651 m (5' 5\")   Wt 74.8 kg (165 lb)   SpO2 99%   BMI 27.46 kg/m    BMI= Body mass index is 27.46 kg/m .      Examination     General Appearance:  Alert, cooperative, no distress  Head:    Normocephalic, without obvious abnormality, atraumatic  EENT:  PERRL, conjunctiva/corneas clear, EOM's intact.   Neck:   Supple, symmetrical, trachea midline, no adenopathy; no NVE  Back:  Symmetric, no curvature, no CVA tenderness  Chest/Lungs: Clear to " auscultation bilaterally, respirations unlabored, No tenderness or deformity. No abdominal breathing or use of accessory muscles.   Heart:    Regular rate and rhythm, S1 and S2 normal, no murmur, rub   or gallop  Abdomen: Soft, non-tender, bowel sounds active all four quadrants, not peritoneal on palpation. Not distended  Extremities:  Extremities normal, atraumatic, no swelling   Skin:  Skin color, texture, turgor normal, no rashes or lesion  Neurologic:  Awake and alert, No lateralizing or localizing signs          Pertinent Lab     Results for orders placed or performed during the hospital encounter of 10/08/23   CT Abdomen Pelvis w/o Contrast    Impression    IMPRESSION:   1.  Bilateral ureteral stents in place. There is moderate dilatation of the right renal collecting system and mild dilatation of the left renal collecting system. The urinary bladder is moderately distended.  2.  Few bilateral intrarenal stones.  3.  Small hiatal hernia.     UA with Microscopic reflex to Culture    Specimen: Urine, Clean Catch   Result Value Ref Range    Color Urine Light Orange (A) Colorless, Straw, Light Yellow, Yellow    Appearance Urine Cloudy (A) Clear    Glucose Urine 30 (A) Negative mg/dL    Bilirubin Urine Negative Negative    Ketones Urine Negative Negative mg/dL    Specific Gravity Urine 1.019 1.001 - 1.030    Blood Urine >1.0 mg/dL (A) Negative    pH Urine 6.0 5.0 - 7.0    Protein Albumin Urine 200 (A) Negative mg/dL    Urobilinogen Urine <2.0 <2.0 mg/dL    Nitrite Urine Negative Negative    Leukocyte Esterase Urine 500 Thang/uL (A) Negative    Mucus Urine Present (A) None Seen /LPF    RBC Urine >182 (H) <=2 /HPF    WBC Urine >182 (H) <=5 /HPF    Squamous Epithelials Urine 4 (H) <=1 /HPF    Hyaline Casts Urine 12 (H) <=2 /LPF   Basic metabolic panel   Result Value Ref Range    Sodium 144 135 - 145 mmol/L    Potassium 3.6 3.4 - 5.3 mmol/L    Chloride 104 98 - 107 mmol/L    Carbon Dioxide (CO2) 30 (H) 22 - 29 mmol/L     Anion Gap 10 7 - 15 mmol/L    Urea Nitrogen 12.5 6.0 - 20.0 mg/dL    Creatinine 1.17 0.67 - 1.17 mg/dL    GFR Estimate 85 >60 mL/min/1.73m2    Calcium 9.5 8.6 - 10.0 mg/dL    Glucose 104 (H) 70 - 99 mg/dL   CBC with platelets and differential   Result Value Ref Range    WBC Count 7.3 4.0 - 11.0 10e3/uL    RBC Count 5.23 4.40 - 5.90 10e6/uL    Hemoglobin 15.8 13.3 - 17.7 g/dL    Hematocrit 47.0 40.0 - 53.0 %    MCV 90 78 - 100 fL    MCH 30.2 26.5 - 33.0 pg    MCHC 33.6 31.5 - 36.5 g/dL    RDW 12.3 10.0 - 15.0 %    Platelet Count 237 150 - 450 10e3/uL    % Neutrophils 77 %    % Lymphocytes 15 %    % Monocytes 6 %    Mids % (Monos, Eos, Basos)      % Eosinophils 1 %    % Basophils 1 %    % Immature Granulocytes 0 %    NRBCs per 100 WBC 0 <1 /100    Absolute Neutrophils 5.6 1.6 - 8.3 10e3/uL    Absolute Lymphocytes 1.1 0.8 - 5.3 10e3/uL    Absolute Monocytes 0.4 0.0 - 1.3 10e3/uL    Mids Abs (Monos, Eos, Basos)      Absolute Eosinophils 0.1 0.0 - 0.7 10e3/uL    Absolute Basophils 0.1 0.0 - 0.2 10e3/uL    Absolute Immature Granulocytes 0.0 <=0.4 10e3/uL    Absolute NRBCs 0.0 10e3/uL           Pertinent Radiology

## 2023-10-09 NOTE — PROGRESS NOTES
Brief Remote  Note    33-year-old male with bilateral ureteral stents in place for ureteral stones (placed 9/18 with Dr. Avina) presents to the ER with worsening bilateral flank pain, hematuria.     Patient is afebrile. WBC and creatinine reassuring. UA infectious with 500 leuks, >182 WBCs, >182 RBCs. UC pending. CT a/p shows bilateral ureteral stents in place, moderate dilatation of the right renal collecting system and mild dilatation of the left renal collecting system. Moderately distended bladder. Few bilateral intrarenal stones. PVR scan done following imaging which was normal. Patient given Rocephin. Urology consulted for management recommendations.     Bilateral hydro seen on CT could certainly be related to distended bladder, bilateral stents resulting in reflux. Patient stable. Recommend admission to observation for antibiotics, UC, and renal ultrasound (with an empty bladder) in the morning. Would anticipate the hydro be resolved with emptying of the bladder. NPO at midnight in case persistent hydro is seen, in which case stent exchange may be indicated.     Urology will see patient in the morning. Please call overnight with any questions or concerns.     Discussed patient with Dr. Jay Tijerina.     Thank you for contacting [facility] to assist with this patient's care.       Kaity Enriquez PA-C  [facility]

## 2023-10-09 NOTE — PHARMACY-ADMISSION MEDICATION HISTORY
Pharmacist Admission Medication History    Admission medication history is complete. The information provided in this note is only as accurate as the sources available at the time of the update.    Information Source(s): Patient, [facility]'s pharmacy, and [facility] via in-person and phone    Pertinent Information: Patient was able to confirm current medications and last known administration times.   Changes made to PTA medication list:  Added: All medications below were added  Deleted: None  Changed: None    Medication Affordability: No       Allergies reviewed with patient and updates made in EHR: yes    Medication History Completed By: Ricco Lester [facility] 10/9/2023 11:03 AM    PTA Med List   Medication Sig Last Dose    acetaminophen (TYLENOL) 500 MG tablet Take 500-1,000 mg by mouth every 6 hours as needed for mild pain PRN    ibuprofen (ADVIL/MOTRIN) 200 MG tablet Take 200 mg by mouth every 4 hours as needed for pain PRN    methadone HCl 10 MG/5ML SOLN Take 74 mg by mouth daily 10/9/2023 at 0900

## 2023-10-09 NOTE — PROGRESS NOTES
[facility] MEDICINE  PROGRESS NOTE       Securely message me with Manju (more info)    Code Status: Full Code       Identification/Summary:   Keshawn Jimenez is a 33 year old male with a PMH of bilateral nephrolithiasis, L UPJ stone s/p stone removal and bilateral stents.  10/8/2023 admitted for abdominal pain. Found to have worsening hydronephrosis.    Assessment and Plan:  UTI  Bilateral hydronephrosis  Bilateral nephrolithiasis s/p stents  Intermittent hematuria ---  Last 9/18 - urology procedures were done for Right ureteral stone and Left UPJ stone. And had Bilateral ureteral stents in place.     CT on admission -- moderate dilatation of the right renal collecting system and mild dilatation of the left renal collecting system.   Previous CT 8/21 showed: R Hydronephrosis has improved, though minimal collecting system dilatation persists and interval migration of a 2 x 3 mm left renal stone to the ureteropelvic junction. Very minimal hydronephrosis.    No previous urine culture. Was not on abx.     - IVF, NPO, anti emetics, pain meds, and antibiotics  - Urology ref  - CBC/CMP reviewed  - urine culture pending    TD?  Beset Cr in history 0.85, now 1.17 > 0.95, likely from post-renal cause from stone  -continue IVF     History of depression, chemical dependency  - PTA meds   - on liquid methadone 72 mgs daily from [facility] -- dose today received         VTE prophylaxis --  SCDs  Diet -- NPO  Code Status -- Full  Barriers to discharge -- Admitting/Acute medical condition/s    Current Diet  Orders Placed This Encounter      NPO for Medical/Clinical Reasons Except for: Ice Chips, Meds    Supplements  None          Barriers to Discharge: possible urology procedure     Disposition: floor, likely home at discharge    Clinically Significant Risk Factors Present on Admission                       # Overweight: Estimated body mass index is 27.96 kg/m  as calculated from the following:     "Height as of this encounter: 1.651 m (5' 5\").    Weight as of this encounter: 76.2 kg (168 lb).              Interval History/Subjective:  Pain is better. No other concern. Questions answered to verbalized satisfaction.      Last 24H PRN:     acetaminophen (TYLENOL) tablet 650 mg, 650 mg at 10/09/23 0626 **OR** acetaminophen (TYLENOL) Suppository 650 mg    Physical Exam/Objective:  Temp:  [98.1  F (36.7  C)-99.1  F (37.3  C)] 98.1  F (36.7  C)  Pulse:  [] 85  Resp:  [16-20] 16  BP: (116-131)/(83-96) 116/83  SpO2:  [95 %-99 %] 96 %  Wt Readings from Last 4 Encounters:   10/09/23 76.2 kg (168 lb)   08/31/23 80.2 kg (176 lb 12.8 oz)   07/02/23 83.9 kg (185 lb)   08/18/21 77.1 kg (170 lb)     Body mass index is 27.96 kg/m .    General Appearance: Alert and wake, not in distress  Respiratory: clear lungs, no crackles or wheezing  Cardiovascular: rhythmic, normal S1 and S2, no murmur  GI: soft, non-tender, normal bowel sound  Neurology: oriented x 3  Psych: cooperative and calm, normal affect    Medications:   Personally Reviewed.  Medications    sodium chloride 100 mL/hr at 10/09/23 0224      cefTRIAXone  2 g Intravenous Q24H       Data reviewed today: I personally reviewed all new medications, labs, imaging/diagnostics reports over the past 24 hours. Pertinent findings include:    Imaging:   Recent Results (from the past 24 hour(s))   CT Abdomen Pelvis w/o Contrast    Narrative    EXAM: CT ABDOMEN PELVIS W/O CONTRAST  LOCATION: [facility]  DATE: 10/8/2023    INDICATION: hx of bilateral stones  and  stent placement, concern for pyelo. Hematuria.  COMPARISON: 08/21/2023.  TECHNIQUE: CT scan of the abdomen and pelvis was performed without IV contrast. Multiplanar reformats were obtained. Dose reduction techniques were used.  CONTRAST: None.    FINDINGS:   LOWER CHEST: There is mild dependent atelectasis at the lung bases. Small hiatal hernia.    HEPATOBILIARY: Normal.    PANCREAS: " Normal.    SPLEEN: Normal.    ADRENAL GLANDS: Normal.    KIDNEYS/BLADDER: There are bilateral double-J ureteral stents in place. There is moderate dilatation of right renal collecting system and mild dilatation of the left renal collecting system. There are 4 stones within the right kidney measuring up to 6   mm. There is a single 5 mm stone in the lower pole of the left kidney. There are no ureteral stones. Urinary bladder is moderately distended.    BOWEL: There is no bowel obstruction or inflammation. The appendix is normal. There is no free intraperitoneal gas or fluid.    LYMPH NODES: Normal.    VASCULATURE: Unremarkable.    PELVIC ORGANS: Normal.    MUSCULOSKELETAL: Normal.      Impression    IMPRESSION:   1.  Bilateral ureteral stents in place. There is moderate dilatation of the right renal collecting system and mild dilatation of the left renal collecting system. The urinary bladder is moderately distended.  2.  Few bilateral intrarenal stones.  3.  Small hiatal hernia.     US Renal Complete Non-Vascular    Narrative    EXAM: US RENAL COMPLETE NON-VASCULAR  LOCATION: [facility]  DATE: 10/9/2023    INDICATION: Bilateral hydro with stents on CT, assess for resolution  COMPARISON: CT 10/08/2023.  TECHNIQUE: Routine Bilateral Renal and Bladder Ultrasound.    FINDINGS:    RIGHT KIDNEY: 11.3 x 6.3 x 6.9 cm. There is moderate right hydronephrosis. Stones in the lower pole. Stent in the renal pelvis.    LEFT KIDNEY: 11.2 x 4.7 x 5.1 cm. There is mild hydronephrosis. Stone in the lower pole. Stent in the renal pelvis.    BLADDER: Ureteral stents in the bladder. The bladder is nearly empty.      Impression    IMPRESSION:  1.  Moderate right and mild left hydronephrosis.       Labs:  US Renal Complete Non-Vascular   Final Result   IMPRESSION:   1.  Moderate right and mild left hydronephrosis.      CT Abdomen Pelvis w/o Contrast   Final Result   IMPRESSION:    1.  Bilateral ureteral stents in  place. There is moderate dilatation of the right renal collecting system and mild dilatation of the left renal collecting system. The urinary bladder is moderately distended.   2.  Few bilateral intrarenal stones.   3.  Small hiatal hernia.           Recent Results (from the past 24 hour(s))   UA with Microscopic reflex to Culture    Collection Time: 10/08/23  9:01 PM    Specimen: Urine, Clean Catch   Result Value Ref Range    Color Urine Light Orange (A) Colorless, Straw, Light Yellow, Yellow    Appearance Urine Cloudy (A) Clear    Glucose Urine 30 (A) Negative mg/dL    Bilirubin Urine Negative Negative    Ketones Urine Negative Negative mg/dL    Specific Gravity Urine 1.019 1.001 - 1.030    Blood Urine >1.0 mg/dL (A) Negative    pH Urine 6.0 5.0 - 7.0    Protein Albumin Urine 200 (A) Negative mg/dL    Urobilinogen Urine <2.0 <2.0 mg/dL    Nitrite Urine Negative Negative    Leukocyte Esterase Urine 500 Thang/uL (A) Negative    Mucus Urine Present (A) None Seen /LPF    RBC Urine >182 (H) <=2 /HPF    WBC Urine >182 (H) <=5 /HPF    Squamous Epithelials Urine 4 (H) <=1 /HPF    Hyaline Casts Urine 12 (H) <=2 /LPF   Basic metabolic panel    Collection Time: 10/08/23  9:07 PM   Result Value Ref Range    Sodium 144 135 - 145 mmol/L    Potassium 3.6 3.4 - 5.3 mmol/L    Chloride 104 98 - 107 mmol/L    Carbon Dioxide (CO2) 30 (H) 22 - 29 mmol/L    Anion Gap 10 7 - 15 mmol/L    Urea Nitrogen 12.5 6.0 - 20.0 mg/dL    Creatinine 1.17 0.67 - 1.17 mg/dL    GFR Estimate 85 >60 mL/min/1.73m2    Calcium 9.5 8.6 - 10.0 mg/dL    Glucose 104 (H) 70 - 99 mg/dL   CBC with platelets and differential    Collection Time: 10/08/23  9:07 PM   Result Value Ref Range    WBC Count 7.3 4.0 - 11.0 10e3/uL    RBC Count 5.23 4.40 - 5.90 10e6/uL    Hemoglobin 15.8 13.3 - 17.7 g/dL    Hematocrit 47.0 40.0 - 53.0 %    MCV 90 78 - 100 fL    MCH 30.2 26.5 - 33.0 pg    MCHC 33.6 31.5 - 36.5 g/dL    RDW 12.3 10.0 - 15.0 %    Platelet Count 237 150 - 450  10e3/uL    % Neutrophils 77 %    % Lymphocytes 15 %    % Monocytes 6 %    Mids % (Monos, Eos, Basos)      % Eosinophils 1 %    % Basophils 1 %    % Immature Granulocytes 0 %    NRBCs per 100 WBC 0 <1 /100    Absolute Neutrophils 5.6 1.6 - 8.3 10e3/uL    Absolute Lymphocytes 1.1 0.8 - 5.3 10e3/uL    Absolute Monocytes 0.4 0.0 - 1.3 10e3/uL    Mids Abs (Monos, Eos, Basos)      Absolute Eosinophils 0.1 0.0 - 0.7 10e3/uL    Absolute Basophils 0.1 0.0 - 0.2 10e3/uL    Absolute Immature Granulocytes 0.0 <=0.4 10e3/uL    Absolute NRBCs 0.0 10e3/uL   Comprehensive metabolic panel    Collection Time: 10/09/23  6:01 AM   Result Value Ref Range    Sodium 145 135 - 145 mmol/L    Potassium 4.2 3.4 - 5.3 mmol/L    Carbon Dioxide (CO2) 28 22 - 29 mmol/L    Anion Gap 11 7 - 15 mmol/L    Urea Nitrogen 7.2 6.0 - 20.0 mg/dL    Creatinine 0.95 0.67 - 1.17 mg/dL    GFR Estimate >90 >60 mL/min/1.73m2    Calcium 9.0 8.6 - 10.0 mg/dL    Chloride 106 98 - 107 mmol/L    Glucose 94 70 - 99 mg/dL    Alkaline Phosphatase 44 40 - 129 U/L    AST 23 0 - 45 U/L    ALT 19 0 - 70 U/L    Protein Total 6.8 6.4 - 8.3 g/dL    Albumin 3.8 3.5 - 5.2 g/dL    Bilirubin Total 0.4 <=1.2 mg/dL   CBC with platelets and differential    Collection Time: 10/09/23  6:01 AM   Result Value Ref Range    WBC Count 5.2 4.0 - 11.0 10e3/uL    RBC Count 5.00 4.40 - 5.90 10e6/uL    Hemoglobin 15.2 13.3 - 17.7 g/dL    Hematocrit 45.4 40.0 - 53.0 %    MCV 91 78 - 100 fL    MCH 30.4 26.5 - 33.0 pg    MCHC 33.5 31.5 - 36.5 g/dL    RDW 12.4 10.0 - 15.0 %    Platelet Count 200 150 - 450 10e3/uL    % Neutrophils 49 %    % Lymphocytes 39 %    % Monocytes 7 %    Mids % (Monos, Eos, Basos)      % Eosinophils 4 %    % Basophils 1 %    % Immature Granulocytes 0 %    NRBCs per 100 WBC 0 <1 /100    Absolute Neutrophils 2.6 1.6 - 8.3 10e3/uL    Absolute Lymphocytes 2.0 0.8 - 5.3 10e3/uL    Absolute Monocytes 0.4 0.0 - 1.3 10e3/uL    Mids Abs (Monos, Eos, Basos)      Absolute Eosinophils  0.2 0.0 - 0.7 10e3/uL    Absolute Basophils 0.0 0.0 - 0.2 10e3/uL    Absolute Immature Granulocytes 0.0 <=0.4 10e3/uL    Absolute NRBCs 0.0 10e3/uL       Pending Labs:  Unresulted Labs Ordered in the Past 30 Days of this Admission       Date and Time Order Name Status Description    10/8/2023 10:00 PM Urine Culture In process             MINUTES SPENT BY ME on the date of service doing chart review, history, exam, documentation & further activities per the note.      HARSHAD MILLS MD  [facility] Medicine  [facility]  Phone: #[phone]    Securely message me with [email or web address] (more info)

## 2023-10-09 NOTE — ED PROVIDER NOTES
"Emergency Department Staff Physician Note     I had a face to face encounter with this patient seen by the Advanced Practice Provider (SEVEN).  I have seen, examined, and discussed the patient with the SEVEN and agree with their assessment and plan of management.    Relevant HPI:     Keshawn Jimenez is a 33 year old male who presents to the Emergency Department for evaluation of hematuria.     The patient had the ureteral stents placed on 09/18, and later woke up with \"excruciating pain\" that was \"the worst pain that I have ever had in my entire life\". He says that he has had bilateral pain in the areas where the stents were placed that is worse on his left side. He has also had lower back pain.     I, Alhaji Downs, am serving as a scribe to document services personally performed by Anya Rondon MD, based on my observations and the provider's statements to me.   I, Anya Rondon MD, attest that Miguel Downs was acting in a scribe capacity, has observed my performance of the services and has documented them in accordance with my direction.    ED Course:  10:28 PM I received the patient report from the SEVEN, Arpita Petersen PA-C. I agree with their assessment and plan of management, and I will see the patient.  11:14 PM I met with the patient to introduce myself, gather additional history, perform my initial exam, and discuss the plan.     Brief Physical Exam:  VITAL SIGNS: BP (!) 126/90   Pulse 101   Temp 99.1  F (37.3  C) (Oral)   Resp 20   Ht 1.651 m (5' 5\")   Wt 74.8 kg (165 lb)   SpO2 99%   BMI 27.46 kg/m    Generalized: does not appear toxic, interactive and in no apparent distress.   HEENT: No eye discharge or redness, no nasal drainage or bleeding. Resp: Equal work of breathing with bilateral chest rise present.  CV: Warm extremities, regular rate.  Neuro: Interactive, no aphasia or dysarthria, no facial droop.  MSK: Moving extremities spontaneously. No focal deformity or trauma noted to extremities.  Psych: " Cooperative, does not appear to be hallucinating or responding to internal stimuli.     Impression / ED Plan:  I had a face to face encounter with this patient seen by the Advanced Practice Provider (SEVEN).  I have seen, examined, and discussed the patient with the SEVEN and agree with their assessment and plan of management. I personally saw the patient and performed a substantive portion of the visit including all aspects of the medical decision making.        No diagnosis found.    Anya Rondon MD  Staff Physician  [facility] Emergency Department      Anya Rondon MD  10/27/23 0292

## 2023-10-09 NOTE — PLAN OF CARE
"  Problem: Plan of Care - These are the overarching goals to be used throughout the patient stay.    Goal: Plan of Care Review  Description: The Plan of Care Review/Shift note should be completed every shift.  The Outcome Evaluation is a brief statement about your assessment that the patient is improving, declining, or no change.  This information will be displayed automatically on your shift note.  Outcome: Progressing  Goal: Patient-Specific Goal (Individualized)  Description: You can add care plan individualizations to a care plan. Examples of Individualization might be:  \"Parent requests to be called daily at 9am for status\", \"I have a hard time hearing out of my right ear\", or \"Do not touch me to wake me up as it startles me\".  Outcome: Progressing  Goal: Absence of Hospital-Acquired Illness or Injury  Outcome: Progressing  Intervention: Identify and Manage Fall Risk  Recent Flowsheet Documentation  Taken 10/9/2023 0600 by Aruna De La Rosa RN  Safety Promotion/Fall Prevention:   safety round/check completed   room near nurse's station   nonskid shoes/slippers when out of bed   clutter free environment maintained  Taken 10/9/2023 0130 by Aruna De La Rosa, RN  Safety Promotion/Fall Prevention:   safety round/check completed   room near nurse's station   nonskid shoes/slippers when out of bed   clutter free environment maintained  Goal: Optimal Comfort and Wellbeing  Outcome: Progressing  Goal: Readiness for Transition of Care  Outcome: Progressing  Intervention: Mutually Develop Transition Plan  Recent Flowsheet Documentation  Taken 10/9/2023 0200 by Aruna De La Rosa, RN  Equipment Currently Used at Home: none   Goal Outcome Evaluation:       Patient VSS. Patient was NPO after midnight. Pain controlled with tylenol. Up independently. NS running at 100 ml/hour.Patient care and education on-going.   Aruna De La Rosa, ONEIL                 "

## 2023-10-09 NOTE — PROGRESS NOTES
Care Management Follow Up    Length of Stay (days): 0    Expected Discharge Date: 10/10/2023     Concerns to be Addressed: no discharge needs identified     Patient plan of care discussed at interdisciplinary rounds: Yes    Anticipated Discharge Disposition: Home     Anticipated Discharge Services: None  Anticipated Discharge DME: None    Patient/family educated on Medicare website which has current facility and service quality ratings: no  Education Provided on the Discharge Plan: No  Patient/Family in Agreement with the Plan: yes    Referrals Placed by CM/SW:  None   Private pay costs discussed: Not applicable    Additional Information:  Chart reviewed.  No CM needs identified.  Family to transport.     JAVAD Doe

## 2023-10-09 NOTE — ED PROVIDER NOTES
EMERGENCY DEPARTMENT ENCOUNTER      NAME: Keshawn Jimenez  AGE: 33 year old male  YOB: 1990  MRN: [id number]  EVALUATION DATE & TIME: 10/8/2023  8:44 PM    PCP: No Ref-Primary, Physician    ED PROVIDER: Arpita Petersen PA-C      Chief Complaint   Patient presents with    Hematuria     Kidney stone surgery          FINAL IMPRESSION:  1. UTI (urinary tract infection)    2. Ureteral stent present    3. Hydronephrosis, unspecified hydronephrosis type          ED COURSE & MEDICAL DECISION MAKIN:45 PM Met with patient for initial interview. Plan for care discussed.  10:28 PM Staffed patient with Dr. Rondon.   10:30 PM Reevaluated and updated patient.  11:12 PM Page sent out to urology.   11:26 PM Spoke with urology who recommends admission for observation and formal renal US in the AM and continue IV Rocephin as started. If hydronephrosis persists will plan for stent exchange. Urology will plan to see patient in AM.  11:58 PM Reevaluated and updated patient.  12:15 AM Spoke with hospitalist, Dr. Gracia, who kindly accepts the admission.     33 year old male with a history of polysubstance abuse (on methadone), nephrolithiasis presents to the Emergency Department for evaluation of hematuria and bilateral flank pain in setting of bilateral ureteral stents. Per chart review, patient underwent bilateral cystoscopy with lithotripsy with bilateral stone extraction and stent placement. However, per patient unable to extract stone on left and he has repeat cystoscopy with right ureteral stent removal, left ureteroscopy with left lithotripsy and stent exchange. Upon exam, patient is afebrile, tachycardic, mildly hypertensive, and appears uncomfortable. Abdomen soft and non-tender with no CVA tenderness to percussion. Differential diagnosis includes but not limited to UTI, TD, electrolyte abnormality, anemia, dehydration. Based on patient's presenting symptoms, laboratories and imaging were  ordered. Patient was treated with IV Dilaudid and NS 1L upon arrival with moderate pain relief.    CBC without leukocytosis or anemia. BMP unremarkable. UA concerning for infection. IV Rocephin ordered. Pre-void bladder scan 244 mL, post-void 22 mL. CT revealed bilateral stents in placed, but with slightly increased dilatation of collecting system as well as bladder distention.     Symptoms and workup most consistent with UTI in setting of bilateral ureteral stents with hydronephrosis. Spoke with urology who recommends admission for observation and formal renal US in the AM and continue IV Rocephin as started. If hydronephrosis persists will plan for stent exchange. Urology will plan to see patient in AM. Patient was made aware of the above findings. I spoke with Dr. Gracia, hospitalist, who kindly accepts the admission. The patient was stable throughout ER visit and upon transfer to the floor.      Medical Decision Making    History:  Supplemental history from: Documented in chart, if applicable  External Record(s) reviewed: Outpatient Record: 07/02/2023 [facility] Emergency Department Visit    Work Up:  Chart documentation includes differential considered and any EKGs or imaging independently interpreted by provider, where specified.  In additional to work up documented, I considered the following work up: Documented in chart, if applicable.    External consultation:  Discussion of management with another provider: Documented in chart, if applicable    Complicating factors:  Care impacted by chronic illness: N/A  Care affected by social determinants of health: Alcohol Abuse and/or Recreational Drug Use    Disposition considerations: Admit.        MEDICATIONS GIVEN IN THE EMERGENCY:  Medications   melatonin tablet 1 mg (has no administration in time range)   ondansetron (ZOFRAN ODT) ODT tab 4 mg (has no administration in time range)     Or   ondansetron (ZOFRAN) injection 4 mg (has no administration in time  "range)   sodium chloride 0.9 % infusion (has no administration in time range)   acetaminophen (TYLENOL) tablet 650 mg (has no administration in time range)     Or   acetaminophen (TYLENOL) Suppository 650 mg (has no administration in time range)   HYDROcodone-acetaminophen (NORCO) 5-325 MG per tablet 1-2 tablet (has no administration in time range)   HYDROmorphone (PF) (DILAUDID) injection 0.5 mg (0.5 mg Intravenous $Given 10/8/23 2107)   sodium chloride 0.9% BOLUS 1,000 mL (0 mLs Intravenous Stopped 10/8/23 2314)   cefTRIAXone (ROCEPHIN) 2 g vial to attach to  ml bag for ADULTS or NS 50 ml bag for PEDS (2 g Intravenous $New Bag 10/8/23 2324)   HYDROmorphone (PF) (DILAUDID) injection 0.5 mg (0.5 mg Intravenous $Given 10/8/23 2325)       NEW PRESCRIPTIONS STARTED AT TODAY'S ER VISIT  New Prescriptions    No medications on file          =================================================================    HPI    Patient information was obtained from: the patient    Use of : N/A         Keshawn Jimenez is a 33 year old male with a pertinent history of kidney stones and opioid dependence who presents to this ED by walk-in for evaluation of flank pain and hematuria. The patient had the ureteral stents placed on 09/18, and later woke up with \"excruciating pain\" that was \"the worst pain that I have ever had in my entire life\". He says that he has had bilateral pain in the areas where the stents were placed that is worse on his left side. He has also had lower back pain. He is supposed to have surgery on 10/13 to have the stents exchanged. On the night of 10/05, the patient experienced pain in both of his sides of his abdomen. On 10/06, he felt \"as if I had kidney stones\" and took pain medications with no relief. Additionally, he has had blood in his urine since 09/18 that is worsened by movement. He reports having a burning sensation with urination several weeks ago, but says that it has since subsided. " "Currently, he says that it feels as if he is being \"squeezed from the inside\". The patient denies vomiting or having nausea, changes to his bowel movements, a fever, chills, chest pain or shortness of breath. Of note, the patient has had kidney stones before although he wasn't hospitalized.      Per Chart Review, the patient was seen on 07/02/2023 at [facility] for evaluation of right-sided flank pain. His abdomen CT scan showed two distal ureteral stones, with the larger one being 3 mm on the right. There was an additional nonobstructing left 5 mm stone also noted. UA microscopic revealed presence of mucus in urine. CBC was unremarkable. The patient was given 500 mg tablets of tylenol to take every six hours, 50 mg dramamine to take at bedtime, 200 mg tablets of ibuprofen to take every six hours, 4 mg tablets of ondansetron to take every eight hours and 5 mg tablets of oxycodone to take every four hours.     REVIEW OF SYSTEMS   Review of Systems   Constitutional:  Negative for chills and fever.   Respiratory:  Negative for shortness of breath.    Cardiovascular:  Negative for chest pain.   Gastrointestinal:  Negative for nausea and vomiting.   Genitourinary:  Positive for hematuria. Negative for dysuria.        Positive for bilateral flank pain.   Musculoskeletal:         Positive for lower back pain.         PAST MEDICAL HISTORY:  Past Medical History:   Diagnosis Date    Chemical dependency (H)     Depressive disorder     History of amphetamine dependence/abuse (H)     History of heroin abuse (H)          Kidney stone     right       PAST SURGICAL HISTORY:  History reviewed. No pertinent surgical history.        CURRENT MEDICATIONS:    oxyCODONE (ROXICODONE) 5 MG tablet        ALLERGIES:  Allergies   Allergen Reactions    Iodinated Contrast Media Other (See Comments)     Had CT for kidney stones 7/2/2023 and had throat itching (Isovue)       FAMILY HISTORY:  Family History   Problem Relation " "Age of Onset    Depression Mother     Mental Illness Mother     Heart Disease Mother         pacemaker    Depression Maternal Aunt     Mental Illness Maternal Aunt     Mental Illness Paternal Uncle     Schizophrenia Paternal Uncle     Mental Illness Paternal Uncle     Schizophrenia Paternal Uncle     Aortic aneurysm Father     Hypertension Father        SOCIAL HISTORY:   Social History     Socioeconomic History    Marital status: Single   Tobacco Use    Smoking status: Every Day     Packs/day: 0.50     Years: 6.00     Pack years: 3.00     Types: Cigarettes     Passive exposure: Current    Smokeless tobacco: Never   Vaping Use    Vaping Use: Never used   Substance and Sexual Activity    Alcohol use: No    Drug use: No     Types: IV     Comment: Drug use: hx heroin abuse.  sober since 2015       VITALS:  BP (!) 126/90   Pulse 101   Temp 99.1  F (37.3  C) (Oral)   Resp 20   Ht 1.651 m (5' 5\")   Wt 74.8 kg (165 lb)   SpO2 99%   BMI 27.46 kg/m      PHYSICAL EXAM    Constitutional:  Alert, in no acute distress. Cooperative.  EYES: Conjunctivae clear.  HENT:  Atraumatic, normocephalic.  Respiratory:  Respirations even, unlabored, in no acute respiratory distress.  Cardiovascular:  Regular rate, good peripheral perfusion.  GI: Soft, flat, non-distended. Bowel sounds normal. No tenderness to palpation. No guarding, rebound, or other peritoneal signs. No CVA tenderness to percussion.  Musculoskeletal:  No edema. No cyanosis. Range of motion major extremities intact.    Integument: Warm, Dry.   Neurologic:  Alert & oriented. No focal deficits noted.  Psych: Normal mood and affect.      LAB:  All pertinent labs reviewed and interpreted.  Results for orders placed or performed during the hospital encounter of 10/08/23   CT Abdomen Pelvis w/o Contrast    Impression    IMPRESSION:   1.  Bilateral ureteral stents in place. There is moderate dilatation of the right renal collecting system and mild dilatation of the left renal " collecting system. The urinary bladder is moderately distended.  2.  Few bilateral intrarenal stones.  3.  Small hiatal hernia.     UA with Microscopic reflex to Culture    Specimen: Urine, Clean Catch   Result Value Ref Range    Color Urine Light Orange (A) Colorless, Straw, Light Yellow, Yellow    Appearance Urine Cloudy (A) Clear    Glucose Urine 30 (A) Negative mg/dL    Bilirubin Urine Negative Negative    Ketones Urine Negative Negative mg/dL    Specific Gravity Urine 1.019 1.001 - 1.030    Blood Urine >1.0 mg/dL (A) Negative    pH Urine 6.0 5.0 - 7.0    Protein Albumin Urine 200 (A) Negative mg/dL    Urobilinogen Urine <2.0 <2.0 mg/dL    Nitrite Urine Negative Negative    Leukocyte Esterase Urine 500 Thang/uL (A) Negative    Mucus Urine Present (A) None Seen /LPF    RBC Urine >182 (H) <=2 /HPF    WBC Urine >182 (H) <=5 /HPF    Squamous Epithelials Urine 4 (H) <=1 /HPF    Hyaline Casts Urine 12 (H) <=2 /LPF   Basic metabolic panel   Result Value Ref Range    Sodium 144 135 - 145 mmol/L    Potassium 3.6 3.4 - 5.3 mmol/L    Chloride 104 98 - 107 mmol/L    Carbon Dioxide (CO2) 30 (H) 22 - 29 mmol/L    Anion Gap 10 7 - 15 mmol/L    Urea Nitrogen 12.5 6.0 - 20.0 mg/dL    Creatinine 1.17 0.67 - 1.17 mg/dL    GFR Estimate 85 >60 mL/min/1.73m2    Calcium 9.5 8.6 - 10.0 mg/dL    Glucose 104 (H) 70 - 99 mg/dL   CBC with platelets and differential   Result Value Ref Range    WBC Count 7.3 4.0 - 11.0 10e3/uL    RBC Count 5.23 4.40 - 5.90 10e6/uL    Hemoglobin 15.8 13.3 - 17.7 g/dL    Hematocrit 47.0 40.0 - 53.0 %    MCV 90 78 - 100 fL    MCH 30.2 26.5 - 33.0 pg    MCHC 33.6 31.5 - 36.5 g/dL    RDW 12.3 10.0 - 15.0 %    Platelet Count 237 150 - 450 10e3/uL    % Neutrophils 77 %    % Lymphocytes 15 %    % Monocytes 6 %    Mids % (Monos, Eos, Basos)      % Eosinophils 1 %    % Basophils 1 %    % Immature Granulocytes 0 %    NRBCs per 100 WBC 0 <1 /100    Absolute Neutrophils 5.6 1.6 - 8.3 10e3/uL    Absolute Lymphocytes 1.1 0.8  - 5.3 10e3/uL    Absolute Monocytes 0.4 0.0 - 1.3 10e3/uL    Mids Abs (Monos, Eos, Basos)      Absolute Eosinophils 0.1 0.0 - 0.7 10e3/uL    Absolute Basophils 0.1 0.0 - 0.2 10e3/uL    Absolute Immature Granulocytes 0.0 <=0.4 10e3/uL    Absolute NRBCs 0.0 10e3/uL       RADIOLOGY:  Reviewed all pertinent imaging. Please see official radiology report.  CT Abdomen Pelvis w/o Contrast   Final Result   IMPRESSION:    1.  Bilateral ureteral stents in place. There is moderate dilatation of the right renal collecting system and mild dilatation of the left renal collecting system. The urinary bladder is moderately distended.   2.  Few bilateral intrarenal stones.   3.  Small hiatal hernia.         US Renal Complete Non-Vascular    (Results Pending)     I, Jeanine Sullivan, am serving as a scribe to document services personally performed by Arpita Petersen PA-C based on my observation and the provider's statements to me. I, Arpita Petersen PA-C, attest that Jeanine Sullivan is acting in a scribe capacity, has observed my performance of the services and has documented them in accordance with my direction.    Arpita Petersen PA-C  [facility] EMERGENCY ROOM  [address] [address] [phone]      Arpita Petersen PA-C  10/09/23 0104

## 2023-10-09 NOTE — ED TRIAGE NOTES
Patient presents to the ED complaining of bilateral lower pelvic pain and blood in his urine.  He states he has had blood in his urine since the 18th of September following stent placement for kidney stones.  He states the bleeding has actually improved.  He is suppose to go back this Friday to have his stents removed and the left kidney stone removed that they couldn't on the first surgery due to too much inflammation.  He states the pain has got much worse since Thursday.  He was prescribed Oxycodone 5mg and took the last dose at 1930 with no relief.       Triage Assessment       Row Name 10/08/23 2027       Triage Assessment (Adult)    Airway WDL WDL       Respiratory WDL    Respiratory WDL WDL       Skin Circulation/Temperature WDL    Skin Circulation/Temperature WDL WDL       Cardiac WDL    Cardiac WDL WDL       Peripheral/Neurovascular WDL    Peripheral Neurovascular WDL WDL       Cognitive/Neuro/Behavioral WDL    Cognitive/Neuro/Behavioral WDL WDL

## 2023-10-09 NOTE — PROVIDER NOTIFICATION
Dr. Gracia text paged regarding patient dose of methadone this morning. Dr. Gracia wrote patient care order to contact pharmacy. This writer called pharmacy and was told that pharmacy will come for a med reconcilliation this morning and have the methadone verified by [place] pharmacy ASAP.  Aruna De La Rosa RN

## 2023-10-09 NOTE — PHARMACY-CONSULT NOTE
Opioid Treatment Program (M[facility]one [facility]) Information Note      Treatment program name: [facility]   Location ([facility]): [place]   Phone number: [phone]              Spoke with: Laura       Patient's current methadone dose verified as: 74 mg PO daily   Last dose was administered on: 10/9/23 at 0900 (took at home dose as walking into doing Med Reconcile)   Take-home dose information (if applicable): Visited on 9/26/23 and has enough supply to last until 10/9/23      Note(s): Treatment programs in [place] dispense methadone using the 10 mg/ml oral concentrate.

## 2023-10-09 NOTE — ED NOTES
Pt report called to [facility] on unit [address] at this time.  Pt to transport to the unit with staff assist at this time.  Hodan Burroughs RN on 10/9/2023 at 1:47 AM

## 2023-10-09 NOTE — DISCHARGE SUMMARY
"[facility]  Hospitalist Discharge Summary      Date of Admission:  10/8/2023  Date of Discharge:  10/9/2023  5:19 PM  Discharging Provider: HARSHAD MILLS MD  Discharge Service: Hospitalist Service    Discharge Diagnoses   Keshawn Jimenez is a 33 year old male with a PMH of bilateral nephrolithiasis, L UPJ stone s/p stone removal and bilateral stents.  10/8/2023 admitted for abdominal pain. Found to have worsening hydronephrosis.     Assessment and Plan:  Complicated cystitis  Bilateral nephrolithiasis and hydronephrosis s/p stents  Intermittent hematuria  Possible TD  History of depression, chemical dependency      Clinically Significant Risk Factors     # Overweight: Estimated body mass index is 27.96 kg/m  as calculated from the following:    Height as of this encounter: 1.651 m (5' 5\").    Weight as of this encounter: 76.2 kg (168 lb).       Follow-ups Needed After Discharge   Follow-up Appointments     Follow-up and recommended labs and tests       Follow with Dr. Avina for surgery on 10/13            Unresulted Labs Ordered in the Past 30 Days of this Admission       Date and Time Order Name Status Description    10/8/2023 10:00 PM Urine Culture Preliminary         These results will be followed up by our group    Discharge Disposition   Discharged to home  Condition at discharge: Stable    Hospital Course   Patient presented with abd pain and hematuria since bilateral ureteral stent was placed.   CT on admission -- moderate dilatation of the right renal collecting system and mild dilatation of the left renal collecting system.   Previous CT 8/21 showed: R Hydronephrosis has improved, though minimal collecting system dilatation persists and interval migration of a 2 x 3 mm left renal stone to the ureteropelvic junction. Very minimal hydronephrosis.  Urology did an US on day 1 and decided to do procedure in 4 days. Patient is discharged with oxycodone prn for pain on top of his " methadone and cefdinir 10-day supply to treat this complicated UTI suggested by urology. Follow-up is set up.    Consultations This Hospital Stay   UROLOGY IP CONSULT    Code Status   Full Code    Time Spent on this Encounter   I, HARSHAD MILLS MD, personally saw the patient today and spent greater than 30 minutes discharging this patient.       HARSHAD MILLS MD  [address] [address]  Phone: [phone]  Fax: [phone]  ______________________________________________________________________    Physical Exam   Vital Signs: Temp: 98.1  F (36.7  C) Temp src: Oral BP: 122/85 Pulse: 76   Resp: 16 SpO2: 97 % O2 Device: None (Room air)    Weight: 168 lbs 0 oz  General Appearance: Alert and wake, not in distress  Respiratory: clear lungs, no crackles or wheezing  Cardiovascular: rhythmic, normal S1 and S2, no murmur  GI: soft, non-tender, normal bowel sound  Neurology: oriented x 3  Psych: cooperative and calm, normal affect         Primary Care Physician   Physician No Ref-Primary    Discharge Orders      Reason for your hospital stay    Complicated urinary tract infection, concern for urinary tract obstruction     Follow-up and recommended labs and tests     Follow with Dr. Avina for surgery on 10/13     Activity    Your activity upon discharge: activity as tolerated     Diet    Follow this diet upon discharge: Orders Placed This Encounter      Regular Diet Adult       Significant Results and Procedures   Most Recent 3 CBC's:  Recent Labs   Lab Test 10/09/23  0601 10/08/23  2107 07/02/23  0608   WBC 5.2 7.3 9.4   HGB 15.2 15.8 16.4   MCV 91 90 88    237 232     Most Recent 3 BMP's:  Recent Labs   Lab Test 10/09/23  0601 10/08/23  2107 07/02/23  0608    144 140   POTASSIUM 4.2 3.6 3.7   CHLORIDE 106 104 104   CO2 28 30* 23   BUN 7.2 12.5 14   CR 0.95 1.17 1.17   ANIONGAP 11 10 13   KRISTIN 9.0 9.5 9.7   GLC 94 104* 106     Most Recent 2  LFT's:  Recent Labs   Lab Test 10/09/23  0601 06/26/21  0932   AST 23 21   ALT 19 30   ALKPHOS 44 53   BILITOTAL 0.4 1.0   ,   Results for orders placed or performed during the hospital encounter of 10/08/23   CT Abdomen Pelvis w/o Contrast    Narrative    EXAM: CT ABDOMEN PELVIS W/O CONTRAST  LOCATION: [facility]  DATE: 10/8/2023    INDICATION: hx of bilateral stones  and  stent placement, concern for pyelo. Hematuria.  COMPARISON: 08/21/2023.  TECHNIQUE: CT scan of the abdomen and pelvis was performed without IV contrast. Multiplanar reformats were obtained. Dose reduction techniques were used.  CONTRAST: None.    FINDINGS:   LOWER CHEST: There is mild dependent atelectasis at the lung bases. Small hiatal hernia.    HEPATOBILIARY: Normal.    PANCREAS: Normal.    SPLEEN: Normal.    ADRENAL GLANDS: Normal.    KIDNEYS/BLADDER: There are bilateral double-J ureteral stents in place. There is moderate dilatation of right renal collecting system and mild dilatation of the left renal collecting system. There are 4 stones within the right kidney measuring up to 6   mm. There is a single 5 mm stone in the lower pole of the left kidney. There are no ureteral stones. Urinary bladder is moderately distended.    BOWEL: There is no bowel obstruction or inflammation. The appendix is normal. There is no free intraperitoneal gas or fluid.    LYMPH NODES: Normal.    VASCULATURE: Unremarkable.    PELVIC ORGANS: Normal.    MUSCULOSKELETAL: Normal.      Impression    IMPRESSION:   1.  Bilateral ureteral stents in place. There is moderate dilatation of the right renal collecting system and mild dilatation of the left renal collecting system. The urinary bladder is moderately distended.  2.  Few bilateral intrarenal stones.  3.  Small hiatal hernia.     US Renal Complete Non-Vascular    Narrative    EXAM: US RENAL COMPLETE NON-VASCULAR  LOCATION: [facility]  DATE:  10/9/2023    INDICATION: Bilateral hydro with stents on CT, assess for resolution  COMPARISON: CT 10/08/2023.  TECHNIQUE: Routine Bilateral Renal and Bladder Ultrasound.    FINDINGS:    RIGHT KIDNEY: 11.3 x 6.3 x 6.9 cm. There is moderate right hydronephrosis. Stones in the lower pole. Stent in the renal pelvis.    LEFT KIDNEY: 11.2 x 4.7 x 5.1 cm. There is mild hydronephrosis. Stone in the lower pole. Stent in the renal pelvis.    BLADDER: Ureteral stents in the bladder. The bladder is nearly empty.      Impression    IMPRESSION:  1.  Moderate right and mild left hydronephrosis.       Discharge Medications   Discharge Medication List as of 10/9/2023  5:05 PM        START taking these medications    Details   cefdinir (OMNICEF) 300 MG capsule Take 1 capsule (300 mg) by mouth 2 times daily, Disp-20 capsule, R-0, E-Prescribe      oxyCODONE-acetaminophen (PERCOCET) 5-325 MG tablet Take 1 tablet by mouth every 6 hours as needed for pain, Disp-20 tablet, R-0, E-Prescribe           CONTINUE these medications which have CHANGED    Details   ibuprofen (ADVIL/MOTRIN) 200 MG tablet Take 2 tablets (400 mg) by mouth every 6 hours as needed for pain, Disp-30 tablet, R-0, E-Prescribe           CONTINUE these medications which have NOT CHANGED    Details   methadone HCl 10 MG/5ML SOLN Take 74 mg by mouth daily, Historical           STOP taking these medications       acetaminophen (TYLENOL) 500 MG tablet Comments:   Reason for Stopping:             Allergies   Allergies   Allergen Reactions    Iodinated Contrast Media Other (See Comments)     Had CT for kidney stones 7/2/2023 and had throat itching (Isovue)

## 2023-10-10 LAB — BACTERIA UR CULT: NORMAL

## 2023-10-11 ENCOUNTER — OFFICE VISIT (OUTPATIENT)
Dept: INTERNAL MEDICINE | Facility: CLINIC | Age: 33
End: 2023-10-11
Payer: COMMERCIAL

## 2023-10-11 VITALS
HEART RATE: 98 BPM | HEIGHT: 65 IN | RESPIRATION RATE: 16 BRPM | TEMPERATURE: 98.9 F | BODY MASS INDEX: 28.12 KG/M2 | WEIGHT: 168.8 LBS | SYSTOLIC BLOOD PRESSURE: 108 MMHG | DIASTOLIC BLOOD PRESSURE: 66 MMHG | OXYGEN SATURATION: 94 %

## 2023-10-11 DIAGNOSIS — N20.0 KIDNEY STONE: ICD-10-CM

## 2023-10-11 DIAGNOSIS — F11.20 LONG-TERM CURRENT USE OF METHADONE FOR OPIATE DEPENDENCE (H): ICD-10-CM

## 2023-10-11 DIAGNOSIS — Z01.818 PREOPERATIVE EXAMINATION: Primary | ICD-10-CM

## 2023-10-11 PROCEDURE — 99214 OFFICE O/P EST MOD 30 MIN: CPT | Performed by: NURSE PRACTITIONER

## 2023-10-11 NOTE — PROGRESS NOTES
[facility]  [address] [place] [address]  Phone: [phone]  Fax: [phone]  Primary Provider: No Ref-Primary, Physician  Pre-op Performing Provider: ADENIKE MURPHY      PREOPERATIVE EVALUATION:  Today's date: 10/11/2023    Keshawn is a 33 year old male who presents for a preoperative evaluation.      10/11/2023    10:46 AM   Additional Questions   Roomed by        Surgical Information:  Surgery/Procedure: CYSTOSCOPY, RIGHT URETERAL STENT REMOVAL, LEFT URETEROSCOPY WITH LASER LITHOTRIPSY, LEFT RETROGRADE PYELOGRAM, LEFT URETERAL STENT EXCHANGE   Surgery Location: [facility]  Surgeon: Dr. Live  Surgery Date: 10/13/23  Time of Surgery:   Where patient plans to recover: At home with family  Fax number for surgical facility: Note does not need to be faxed, will be available electronically in Epic.    Assessment & Plan     The proposed surgical procedure is considered LOW risk.    Preoperative examination  No contraindications for planned procedure    Kidney stone  Plan cystoscopy with stent removal, laser lithotripsy and stent exchange    Long-term current use of methadone for opiate dependence (H)    Patient Instructions   Hold all supplements, aspirin and NSAIDs for 7 days prior to surgery.     Follow your surgeon's direction on when to stop eating and drinking prior to surgery.    Your surgeon will be managing your pain after your surgery.      Remove all jewelry and metal piercings before your surgery.     Remove nail polish from fingers before surgery.    If you use a CPAP machine, bring this with you to surgery.            - No identified additional risk factors other than previously addressed    Antiplatelet or Anticoagulation Medication Instructions:   - Patient is on no antiplatelet or anticoagulation medications.    Additional Medication Instructions:  Patient is on no additional chronic medications    RECOMMENDATION:  APPROVAL GIVEN to  proceed with proposed procedure, without further diagnostic evaluation.        Subjective       HPI related to upcoming procedure: As above        10/11/2023    10:39 AM   Preop Questions   1. Have you ever had a heart attack or stroke? No   2. Have you ever had surgery on your heart or blood vessels, such as a stent placement, a coronary artery bypass, or surgery on an artery in your head, neck, heart, or legs? No   3. Do you have chest pain with activity? No   4. Do you have a history of  heart failure? No   5. Do you currently have a cold, bronchitis or symptoms of other infection? No   6. Do you have a cough, shortness of breath, or wheezing? No   7. Do you or anyone in your family have previous history of blood clots? YES - Father   8. Do you or does anyone in your family have a serious bleeding problem such as prolonged bleeding following surgeries or cuts? YES - Father   9. Have you ever had problems with anemia or been told to take iron pills? No   10. Have you had any abnormal blood loss such as black, tarry or bloody stools? No   11. Have you ever had a blood transfusion? No   12. Are you willing to have a blood transfusion if it is medically needed before, during, or after your surgery? Yes   13. Have you or any of your relatives ever had problems with anesthesia? No   14. Do you have sleep apnea, excessive snoring or daytime drowsiness? No   15. Do you have any artifical heart valves or other implanted medical devices like a pacemaker, defibrillator, or continuous glucose monitor? No   16. Do you have artificial joints? No   17. Are you allergic to latex? No       Health Care Directive:  Patient does not have a Health Care Directive or Living Will: Discussed advance care planning with patient; however, patient declined at this time.    Preoperative Review of :   reviewed - controlled substances reflected in medication list.      Status of Chronic Conditions:  See problem list for active medical  problems.  Problems all longstanding and stable, except as noted/documented.  See ROS for pertinent symptoms related to these conditions.    Review of Systems  CONSTITUTIONAL: NEGATIVE for fever, chills, change in weight  INTEGUMENTARY/SKIN: NEGATIVE for worrisome rashes, moles or lesions  EYES: NEGATIVE for vision changes or irritation  ENT/MOUTH: NEGATIVE for ear, mouth and throat problems  RESP: NEGATIVE for significant cough or SOB  CV: NEGATIVE for chest pain, palpitations or peripheral edema  GI: NEGATIVE for nausea, abdominal pain, heartburn, or change in bowel habits  : NEGATIVE for frequency, dysuria, or hematuria  MUSCULOSKELETAL: NEGATIVE for significant arthralgias or myalgia  NEURO: NEGATIVE for weakness, dizziness or paresthesias  ENDOCRINE: NEGATIVE for temperature intolerance, skin/hair changes  HEME: NEGATIVE for bleeding problems  PSYCHIATRIC: NEGATIVE for changes in mood or affect    Patient Active Problem List    Diagnosis Date Noted    UTI (urinary tract infection) 10/09/2023     Priority: Medium    Hydronephrosis, unspecified hydronephrosis type 10/09/2023     Priority: Medium    Ureteral stent present 10/09/2023     Priority: Medium    Acute cystitis with hematuria 10/09/2023     Priority: Medium    Long-term current use of methadone for opiate dependence (H) 11/15/2016     Priority: Medium    History of heroin abuse (H)      Priority: Medium    History of amphetamine dependence/abuse (H)      Priority: Medium    Anxiety      Priority: Medium     Created by Conversion  Replacement Utility updated for latest IMO load        Allergic Rhinitis      Priority: Medium     Created by Conversion  Replacement Utility updated for latest IMO load        Chronic Constipation      Priority: Medium     Created by Conversion  Replacement Utility updated for latest IMO load        Abdominal Pain In The Right Lower Belly (RLQ)      Priority: Medium     Created by Conversion        Lower Back Pain       "Priority: Medium     Created by Conversion          Past Medical History:   Diagnosis Date    Chemical dependency (H)     Depressive disorder     History of amphetamine dependence/abuse (H)     History of heroin abuse (H)          Kidney stone     right     No past surgical history on file.  Current Outpatient Medications   Medication Sig Dispense Refill    cefdinir (OMNICEF) 300 MG capsule Take 1 capsule (300 mg) by mouth 2 times daily 20 capsule 0    ibuprofen (ADVIL/MOTRIN) 200 MG tablet Take 2 tablets (400 mg) by mouth every 6 hours as needed for pain 30 tablet 0    methadone HCl 10 MG/5ML SOLN Take 74 mg by mouth daily      oxyCODONE-acetaminophen (PERCOCET) 5-325 MG tablet Take 1 tablet by mouth every 6 hours as needed for pain (Patient not taking: Reported on 10/11/2023) 20 tablet 0       Allergies   Allergen Reactions    Iodinated Contrast Media Other (See Comments)     Had CT for kidney stones 7/2/2023 and had throat itching (Isovue)        Social History     Tobacco Use    Smoking status: Every Day     Packs/day: 0.50     Years: 6.00     Additional pack years: 0.00     Total pack years: 3.00     Types: Cigarettes     Passive exposure: Current    Smokeless tobacco: Never   Substance Use Topics    Alcohol use: No     Family History   Problem Relation Age of Onset    Depression Mother     Mental Illness Mother     Heart Disease Mother         pacemaker    Depression Maternal Aunt     Mental Illness Maternal Aunt     Mental Illness Paternal Uncle     Schizophrenia Paternal Uncle     Mental Illness Paternal Uncle     Schizophrenia Paternal Uncle     Aortic aneurysm Father     Hypertension Father      History   Drug Use No     Comment: Drug use: hx heroin abuse.  sober since 2015         Objective     /66 (BP Location: Right arm, Patient Position: Sitting, Cuff Size: Adult Regular)   Pulse 98   Temp 98.9  F (37.2  C) (Oral)   Resp 16   Ht 1.651 m (5' 5\")   Wt 76.6 kg (168 lb 12.8 oz)   SpO2 94%   " BMI 28.09 kg/m      Physical Exam    GENERAL APPEARANCE: healthy, alert and no distress     EYES: EOMI,  PERRL     HENT: ear canals and TM's normal and nose and mouth without ulcers or lesions     NECK: no adenopathy, no asymmetry, masses, or scars and thyroid normal to palpation     RESP: lungs clear to auscultation - no rales, rhonchi or wheezes     CV: regular rates and rhythm, normal S1 S2, no S3 or S4 and no murmur, click or rub     ABDOMEN:  soft, nontender, no HSM or masses and bowel sounds normal     MS: extremities normal- no gross deformities noted, no evidence of inflammation in joints, FROM in all extremities.     SKIN: no suspicious lesions or rashes     NEURO: Normal strength and tone, sensory exam grossly normal, mentation intact and speech normal     PSYCH: mentation appears normal. and affect normal/bright     LYMPHATICS: No cervical adenopathy    Recent Labs   Lab Test 10/09/23  0601 10/08/23  2107   HGB 15.2 15.8    237    144   POTASSIUM 4.2 3.6   CR 0.95 1.17        Diagnostics:  No labs were ordered during this visit.   No EKG required for low risk surgery (cataract, skin procedure, breast biopsy, etc).    Revised Cardiac Risk Index (RCRI):  The patient has the following serious cardiovascular risks for perioperative complications:   - No serious cardiac risks = 0 points     RCRI Interpretation: 1 point: Class II (low risk - 0.9% complication rate)         Signed Electronically by: Vikash Mendez CNP  Copy of this evaluation report is provided to requesting physician.

## 2023-10-11 NOTE — COMMUNITY RESOURCES LIST (ENGLISH)
10/11/2023   [facility]  N/A  For questions about this resource list or additional care needs, please contact your primary care clinic or care manager.  Phone: [phone]   Email: N/A   Address: [address] [place] [address]   Hours: N/A        Hotlines and Helplines       Hotline - Housing crisis  1  [facility] (Main Office) - Emergency Services Distance: 16.27 miles      Phone/Virtual   [address] [place] [address]  Language: English  Hours: Mon - Sun Open 24 Hours   Phone: (299) 533-2742 Email: info@[facility].org Website: http://WikiWand.org     2  [facility] Distance: 18.34 miles      Phone/Virtual   [address] [place] [address]  Language: English  Hours: Mon - Sun Open 24 Hours   Phone: (318) 514-9927 Email: communications@[facility].org Website: https://[facility]-[place].org/oursaviourshousing/          Housing       Coordinated Entry access point  3  [facility] - [facility] Distance: 11.28 miles      In-Person, Phone/Virtual   [address] [place] [address]  Language: English, Yi  Hours: Mon - Fri 8:30 AM - 4:30 PM  Fees: Free   Phone: (781) 104-3424 Email: info@[facility].org Website: https://www.[facility].org/locations/[facility]-clinic/     4  [facility] ([facility] - Housing Services Distance: 18.45 miles      In-Person   [address] [place] [address]  Language: English  Hours: Mon - Fri 9:00 AM - 5:00 PM  Fees: Free   Phone: (742) 997-1534 Email: housing@[facility].org Website: http://www.[facility].org/housing     Drop-in center or day shelter  5  [facility] Distance: 10.98 miles      In-Person   [address] [place] [address]  Language: English  Hours: Mon - Fri 9:00 AM - 4:00 PM  Fees: Free   Phone: (904) 917-9339 Email: aniyahk@[employer].org Website: http://listeninghouse.org     6  [facility] - Opportunity  Center Distance: 18.58 miles      In-Person   [address] [place] [address]  Language: English, Bulgarian, Citizen of [place]  Hours: Mon - Sat 7:00 AM - 3:00 PM  Fees: Free, Self Pay   Phone: (624) 545-2833 Email: info@[employer] Website: https://www.[facility]/locations/opportunity-center/     Housing search assistance  7  [facility] - Homeless Resources and Housing Information Distance: 1.82 miles      In-Person, Phone/Virtual   [address] [place] [address]  Language: English  Hours: Mon - Fri 8:00 AM - 4:30 PM  Fees: Free   Phone: (980) 160-4806 Email: yuniel@[facility]. Website: https://www.[facility]./Facilities/Facility/Details/[facility]-22     8  [facility] - Housing Resources Distance: 6 miles      In-Person, Phone/Virtual   [address] [place] [address]  Language: English  Hours: Mon - Fri 8:00 AM - 4:30 PM  Fees: Free   Phone: (235) 516-8058 Email: office@[employer] Website: http://[employer]     Shelter for families  9  [facility] Distance: 22.32 miles      In-Person   [address] [place] [address]  Language: English  Hours: Mon - Fri 3:00 PM - 9:00 AM , Sat - Sun Open 24 Hours  Fees: Free   Phone: (921) 597-1327 Ext.1 Website: https://www.[facility].org/2020/07/03/emergency-family-shelter/     Shelter for individuals  10  [facility] [place] [facility] [place] [facility] Distance: 11.36 miles      In-Person   [address] [place] [address]  Language: English  Hours: Mon - Sun 5:00 PM - 10:00 AM  Fees: Free, Self Pay   Phone: (807) 929-5354 Email: info@[facility] Website: https://www.[facility].org/locations/[facility]/     11  [facility] Distance: 18.34 miles      In-Person    [address] [place] [address]  Language: English  Hours: Mon - Sun Open 24 Hours  Fees: Free   Phone: (869) 878-5674 Email: communications@\[facility]\""-[place].org Website: https://\[facility]\""-[place].org/oursaviourshousing/          Important Numbers & Websites       Emergency Services   911  [facility] Services   311  Poison Control   (718) 501-5999  Suicide Prevention Lifeline   (348) 935-8441 (TALK)  Child Abuse Hotline   (694) 778-2504 (4-A-Child)  Sexual Assault Hotline   (486) 357-7961 (HOPE)  National Runaway Safeline   (521) 486-4259 (RUNAWAY)  All-Options Talkline   (126) 411-4447  Substance Abuse Referral   (746) 726-5250 (HELP)

## 2023-10-11 NOTE — COMMUNITY RESOURCES LIST (ENGLISH)
10/11/2023   [facility] - Outpatient Clinics  N/A  For additional resource needs, please contact your health insurance member services or your primary care team.  Phone: [phone]   Email: N/A   Address: [address] [place] [address]   Hours: N/A        Hotlines and Helplines       Hotline - Housing crisis  1  [facility] (Main Office) - Emergency Services Distance: 16.27 miles      Phone/Virtual   [address] [place] [address]  Language: English  Hours: Mon - Sun Open 24 Hours   Phone: (736) 763-9107 Email: info@[facility].org Website: http://[facility].Financial Guard     2  [facility] Distance: 18.34 miles      Phone/Virtual   [address] [place] [address]  Language: English  Hours: Mon - Sun Open 24 Hours   Phone: (265) 908-9411 Email: communications@[facility]-[place].org Website: https://[facility]-mn.org/oursaviourshousing/          Housing       Coordinated Entry access point  3  [facility] Distance: 11.28 miles      In-Person, Phone/Virtual   [address] [place] [address]  Language: English, Maori  Hours: Mon - Fri 8:30 AM - 4:30 PM  Fees: Free   Phone: (810) 943-2274 Email: info@[facility].org Website: https://www.[facility].org/locations/downtow-clinic/     4  [facility] ([facility] - Housing Services Distance: 18.45 miles      In-Person   [address] [place] [address]  Language: English  Hours: Mon - Fri 9:00 AM - 5:00 PM  Fees: Free   Phone: (344) 156-7899 Email: housing@[facility].org Website: http://www.[facility].org/housing     Drop-in center or day shelter  5  [facility] Distance: 10.98 miles      In-Person   [address] [place] [address]  Language: English  Hours: Mon - Fri 9:00 AM - 4:00 PM  Fees: Free   Phone: (696) 859-2950 Email: poppy@[employer].org Website: http://listeninghouse.org     6  [facility] [facility] [place] [facility] Opportunity  Center Distance: 18.58 miles      In-Person   [address] [place] [address]  Language: English, Kyrgyz, Paraguayan  Hours: Mon - Sat 7:00 AM - 3:00 PM  Fees: Free, Self Pay   Phone: (677) 910-2887 Email: info@[employer] Website: https://www.[employer]/locations/opportunity-center/     Housing search assistance  7  [employer] - https://[employer]/ Distance: 19.04 miles      Phone/Virtual   [address] [place] [address]  Language: English  Hours: Mon - Sun Open 24 Hours   Email: info@[employer] Website: https://[employer]     8  [employer] - Online housing search assistance Distance: 20.13 miles      Phone/Virtual   [address] [address]  Language: English, Hmong, Kyrgyz, Paraguayan  Hours: Mon - Sun Open 24 Hours   Phone: (360) 515-1431 Email: info@[employer].org Website: http://www.housinglink.org/     Shelter for families  9  [facility] Distance: 22.32 miles      In-Person   [address] [place] [address]  Language: English  Hours: Mon - Fri 3:00 PM - 9:00 AM , Sat - Sun Open 24 Hours  Fees: Free   Phone: (140) 828-4811 Ext.1 Website: https://www.[facility].org/2020/07/03/emergency-family-shelter/     Shelter for individuals  10  [facility] - [facility] [place] [facility] [place] [facility] Distance: 11.36 miles      In-Person   [address] [place] [address]  Language: English  Hours: Mon - Sun 5:00 PM - 10:00 AM  Fees: Free, Self Pay   Phone: (289) 196-4371 Email: info@[employer] Website: https://www.[employer]/locations/[facility]-saint-paul/     11  [facility] Distance: 18.34 miles      In-Person   [address] [place] [address]  Language: English  Hours: Mon - Sun Open 24 Hours  Fees: Free   Phone: (762) 778-9280 Email: communications@[facility]-[place].org Website:  https://[facility]-[place].org/oursaviourshousing/          Important Numbers & Websites       [facility]   211 211unitedway.org  Poison Control   (814) 834-7918 [place]po[place]  Suicide and Crisis Lifeline   98 98[facility].org  Childhelp [place] Child Abuse Hotline   180.852.4911 Childhelphotline.org  [place] Sexual Assault Hotline   (777) 644-9542 (HOPE) [facility] Runaway Safeline   (250) 858-2115 (RUNAWAY) [facility].org  Pregnancy & Postpartum Support [place]   Call/text 123-703-9673 Ppsupportmn.org  Substance Abuse National Helpline ([facility]   772-720-HELP (8382) Findtreatment.gov  Emergency Services   914

## 2024-01-06 ENCOUNTER — OFFICE VISIT (OUTPATIENT)
Dept: FAMILY MEDICINE | Facility: CLINIC | Age: 34
End: 2024-01-06
Payer: COMMERCIAL

## 2024-01-06 VITALS
HEART RATE: 76 BPM | RESPIRATION RATE: 17 BRPM | DIASTOLIC BLOOD PRESSURE: 72 MMHG | SYSTOLIC BLOOD PRESSURE: 102 MMHG | TEMPERATURE: 97.5 F | OXYGEN SATURATION: 96 %

## 2024-01-06 DIAGNOSIS — R07.0 THROAT PAIN: Primary | ICD-10-CM

## 2024-01-06 LAB
DEPRECATED S PYO AG THROAT QL EIA: NEGATIVE
GROUP A STREP BY PCR: NOT DETECTED

## 2024-01-06 PROCEDURE — 87635 SARS-COV-2 COVID-19 AMP PRB: CPT | Performed by: FAMILY MEDICINE

## 2024-01-06 PROCEDURE — 87651 STREP A DNA AMP PROBE: CPT | Performed by: FAMILY MEDICINE

## 2024-01-06 PROCEDURE — 99213 OFFICE O/P EST LOW 20 MIN: CPT | Performed by: FAMILY MEDICINE

## 2024-01-06 NOTE — PROGRESS NOTES
Assessment & Plan     Throat pain    - Streptococcus A Rapid Screen w/Reflex to PCR - Clinic Collect  - Group A Streptococcus PCR Throat Swab  - Symptomatic COVID-19 Virus (Coronavirus) by PCR Nose    Patient reassured with negative RST.  Agreeable to COVID testing today.  Continue with rest and fluids.  Close Follow-up if no change or new or worsening sx prn.    Sari Busch MD  [facility]    Farheen Liao is a 33 year old, presenting for the following health issues:  Throat Pain and Headache      HPI     Presents with ST and HA this week.  Feels sweaty in AM when he awakens- no fever or cough.  Lives with dad who is high-risk.  No known sick exposures.    Review of Systems   Constitutional, HEENT, cardiovascular, pulmonary, GI, , musculoskeletal, neuro, skin, endocrine and psych systems are negative, except as otherwise noted.      Objective    /72   Pulse 76   Temp 97.5  F (36.4  C)   Resp 17   SpO2 96%   There is no height or weight on file to calculate BMI.  Physical Exam   GENERAL: healthy, alert and no distress  EYES: Eyes grossly normal to inspection, PERRL and conjunctivae and sclerae normal  HENT: normal cephalic/atraumatic, nose and mouth without ulcers or lesions, oropharynx clear, and oral mucous membranes moist  PSYCH: mentation appears normal, affect normal/bright    Results for orders placed or performed in visit on 01/06/24 (from the past 24 hour(s))   Streptococcus A Rapid Screen w/Reflex to PCR - Clinic Collect    Specimen: Throat; Swab   Result Value Ref Range    Group A Strep antigen Negative Negative

## 2024-01-07 LAB — SARS-COV-2 RNA RESP QL NAA+PROBE: NEGATIVE

## 2024-02-15 ENCOUNTER — OFFICE VISIT (OUTPATIENT)
Dept: FAMILY MEDICINE | Facility: CLINIC | Age: 34
End: 2024-02-15
Payer: COMMERCIAL

## 2024-02-15 VITALS
HEART RATE: 88 BPM | OXYGEN SATURATION: 96 % | SYSTOLIC BLOOD PRESSURE: 118 MMHG | RESPIRATION RATE: 16 BRPM | DIASTOLIC BLOOD PRESSURE: 78 MMHG | WEIGHT: 170 LBS | TEMPERATURE: 97.6 F | BODY MASS INDEX: 28.29 KG/M2

## 2024-02-15 DIAGNOSIS — J02.9 SORE THROAT: Primary | ICD-10-CM

## 2024-02-15 LAB
DEPRECATED S PYO AG THROAT QL EIA: NEGATIVE
FLUAV RNA SPEC QL NAA+PROBE: NEGATIVE
FLUBV RNA RESP QL NAA+PROBE: NEGATIVE
GROUP A STREP BY PCR: NOT DETECTED
RSV RNA SPEC NAA+PROBE: NEGATIVE
SARS-COV-2 RNA RESP QL NAA+PROBE: NEGATIVE

## 2024-02-15 PROCEDURE — 99213 OFFICE O/P EST LOW 20 MIN: CPT | Performed by: NURSE PRACTITIONER

## 2024-02-15 PROCEDURE — 87651 STREP A DNA AMP PROBE: CPT | Performed by: NURSE PRACTITIONER

## 2024-02-15 PROCEDURE — 87637 SARSCOV2&INF A&B&RSV AMP PRB: CPT | Performed by: NURSE PRACTITIONER

## 2024-02-15 NOTE — PROGRESS NOTES
"  Assessment & Plan     Sore throat  Rapid strep negative. Likely viral illness-I am most concerned for covid with report of loss of smell-although this could be congestion.  Covid, flu, RSV pending.   Tylenol, ibuprofen, fluids and rest.   - Streptococcus A Rapid Screen w/Reflex to PCR - Clinic Collect  - Asymptomatic Influenza A/B, RSV, & SARS-CoV2 PCR (COVID-19) Nose; Future  - Asymptomatic Influenza A/B, RSV, & SARS-CoV2 PCR (COVID-19) Nose  - Group A Streptococcus PCR Throat Swab            BMI  Estimated body mass index is 28.29 kg/m  as calculated from the following:    Height as of 10/11/23: 1.651 m (5' 5\").    Weight as of this encounter: 77.1 kg (170 lb).             Farheen Liao is a 33 year old, presenting for the following health issues:  Cold Symptoms (Headaches, hot flashes, chills, sore throat and runny nose ; started yesterday, got worse at work (at work coworker was mixing vodka and couldn't smell) ; no exposure to covid )        2/15/2024    12:38 PM   Additional Questions   Roomed by Azeem RAMOS     History of Present Illness       Reason for visit:  Flu like symptoms  Symptom onset:  1-3 days ago  Symptoms include:  Headaches,hot cold chills,sore throat,runny nose  Symptom intensity:  Moderate  Symptom progression:  Worsening  Had these symptoms before:  Yes  Has tried/received treatment for these symptoms:  No  What makes it worse:  Night time  What makes it better:  Shower    He eats 0-1 servings of fruits and vegetables daily.He consumes 2 sweetened beverage(s) daily.He exercises with enough effort to increase his heart rate 30 to 60 minutes per day.  He exercises with enough effort to increase his heart rate 3 or less days per week.   He is taking medications regularly.     No fevers that patient is aware of.  Ibuprofen. Has been helpful.  Has not done home covid test.  Hasn't felt hungry-has been drinking a lot of water.  Feels like when had flu a couple of years ago.   Headache starting " Tuesday night with symptoms progressing yesterday to sore throat.                    Objective    /78 (BP Location: Right arm, Patient Position: Sitting, Cuff Size: Adult Regular)   Pulse 88   Temp 97.6  F (36.4  C) (Temporal)   Resp 16   Wt 77.1 kg (170 lb)   SpO2 96%   BMI 28.29 kg/m    Body mass index is 28.29 kg/m .  Physical Exam  Vitals reviewed.   HENT:      Right Ear: Hearing, tympanic membrane, ear canal and external ear normal.      Left Ear: Hearing, tympanic membrane, ear canal and external ear normal.      Nose: Nose normal.      Mouth/Throat:      Mouth: Mucous membranes are moist.      Pharynx: Oropharynx is clear. Posterior oropharyngeal erythema present.      Tonsils: No tonsillar exudate. 2+ on the right. 2+ on the left.   Cardiovascular:      Rate and Rhythm: Normal rate and regular rhythm.   Pulmonary:      Effort: Pulmonary effort is normal.      Breath sounds: Normal breath sounds.   Neurological:      General: No focal deficit present.      Mental Status: He is alert and oriented to person, place, and time.            Results for orders placed or performed in visit on 02/15/24 (from the past 24 hour(s))   Streptococcus A Rapid Screen w/Reflex to PCR - Clinic Collect    Specimen: Throat; Swab   Result Value Ref Range    Group A Strep antigen Negative Negative           Signed Electronically by: PRETTY KAPLAN CNP

## 2024-03-04 ENCOUNTER — TRANSFERRED RECORDS (OUTPATIENT)
Dept: HEALTH INFORMATION MANAGEMENT | Facility: CLINIC | Age: 34
End: 2024-03-04
Payer: COMMERCIAL

## 2024-04-26 NOTE — CONSULTS
[facility] CONSULT     Type of consult: inpatient  Place of service: [facility]   Reason for consult: Renal stones, hydronephrosis   Requested by: Dr. Gracia    History of Present Illness:   Keshawn Jmienez is a 33 year old male with hx of bilateral nephrolithiasis s/p cystoscopy, right URS with HLL and basket extraction, left URS with ureteral dilation, bilateral ureteral stent placement 9/18; Admitted for Hydronephrosis. A urology consult was placed for further evaluation and recommendations regarding hydronephrosis noted on the CT obtained on 10/8. History obtained through patient and chart review.    CT Abd/Pelvis 10/8 showed bilateral ureteral stents in place, moderate right renal collecting system dilatation, mild left dilatation, bilateral kidney stones (4 in right, 1 in left lower pole), no ureteral stones, moderately distended bladder. Post CT PVR was normal.     UA 10/8 concerning for infection (although 4 squamous present). UC pending. IV ceftriaxone initiated in ED.     Renal US 10/9 showed moderate right and mild left hydro.     This AM, patient reports bilateral flank and abdominal pain has resolved. PVR this AM: 20 ml. Pt reports he is voiding frequently and without difficulty. Denies fever, chills, N/V, scrotal/penile pain.     Pt is scheduled for cysto, right ureteral stent removal, left URS w HLL, left retrograde pyelogram and left stent exchange on 10/13.     NPO since [place] last night in case cysto/stent exchange indicated today.     Past Medical History  Past Medical History:   Diagnosis Date    Chemical dependency (H)     Depressive disorder     History of amphetamine dependence/abuse (H)     History of heroin abuse (H)          Kidney stone     right       Past Surgical History  History reviewed. No pertinent surgical history.    Social History  Social History     Socioeconomic History    Marital status: Single     Spouse name: Not on file    Number of children: Not on file       Patient Seen in: [facility]      History     Chief Complaint   Patient presents with    Abdomen/Flank Pain     Stated Complaint: abdominal painy    Subjective:   17-year-old male presents today with complaints of right upper quadrant pain that started this morning.  Patient has had intermittent pain was seen here about 2 weeks ago with abdominal pain nausea vomiting and was diagnosed with constipation.  Patient has been taken MiraLAX and states that stools mostly liquid now.  Denies any fever or chills.  No nausea vomiting.  Mom states needs form filled out for history of truancy.  Mom states abdominal pain could be from anxiety.  Patient is alert oriented x 3.  Sitting up on the cart without any distress.  The patient's medication list, past medical history and social history elements as listed in today's nurse's notes were reviewed and agreed (except as otherwise stated in the HPI).  The patient's family history reviewed and determined to be noncontributory to the presenting problem            Objective:   History reviewed. No pertinent past medical history.           Past Surgical History:   Procedure Laterality Date    Hc implant ear tubes      Removal adenoids,primary,<13 y/o      Tonsillectomy                  Social History     Socioeconomic History    Marital status: Single   Tobacco Use    Smoking status: Never     Passive exposure: Never    Smokeless tobacco: Never   Vaping Use    Vaping status: Never Used   Substance and Sexual Activity    Alcohol use: Never    Drug use: Yes     Types: Cannabis     Social Determinants of Health      Received from [facility]    Social Connections    Received from [facility]    Housing Stability              Review of Systems    Positive for stated complaint: abdominal painy  Other systems are as noted in HPI.  Constitutional and vital signs reviewed.      All other systems reviewed and negative except as noted  Years of education: Not on file    Highest education level: Not on file   Occupational History    Not on file   Tobacco Use    Smoking status: Every Day     Packs/day: 0.50     Years: 6.00     Pack years: 3.00     Types: Cigarettes     Passive exposure: Current    Smokeless tobacco: Never   Vaping Use    Vaping Use: Never used   Substance and Sexual Activity    Alcohol use: No    Drug use: No     Types: IV     Comment: Drug use: hx heroin abuse.  sober since 2015    Sexual activity: Not on file   Other Topics Concern    Not on file   Social History Narrative    Not on file     Social Determinants of Health     Financial Resource Strain: Not on file   Food Insecurity: Not on file   Transportation Needs: Not on file   Physical Activity: Not on file   Stress: Not on file   Social Connections: Not on file   Interpersonal Safety: Not on file   Housing Stability: Not on file       Medications  Current Facility-Administered Medications   Medication    acetaminophen (TYLENOL) tablet 975 mg    cefTRIAXone (ROCEPHIN) 1 g vial to attach to  mL bag for ADULTS or NS 50 mL bag for PEDS    melatonin tablet 1 mg    [START ON 10/10/2023] methadone (DOLOPHINE-INTENSOL) 10 MG/ML (HIGH CONC) solution 74 mg    naloxone (NARCAN) injection 0.2 mg    Or    naloxone (NARCAN) injection 0.4 mg    Or    naloxone (NARCAN) injection 0.2 mg    Or    naloxone (NARCAN) injection 0.4 mg    ondansetron (ZOFRAN ODT) ODT tab 4 mg    Or    ondansetron (ZOFRAN) injection 4 mg    oxyCODONE (ROXICODONE) tablet 5 mg    sodium chloride 0.9 % infusion       Allergies  Allergies   Allergen Reactions    Iodinated Contrast Media Other (See Comments)     Had CT for kidney stones 7/2/2023 and had throat itching (Isovue)       ROS:   A full 12 point review of systems was taken and is negative aside from what is noted above in the HPI.    Physical exam  /83 (BP Location: Right arm)   Pulse 85   Temp 98.1  F (36.7  C) (Oral)   Resp 16   Ht 1.651 m (5'  above.    Physical Exam     ED Triage Vitals [04/26/24 0807]   /72   Pulse 62   Resp 18   Temp 97.2 °F (36.2 °C)   Temp src Temporal   SpO2 99 %   O2 Device None (Room air)       Current:/72   Pulse 62   Temp 97.2 °F (36.2 °C) (Temporal)   Resp 18   Wt 60.1 kg   SpO2 99%         Physical Exam  Vitals and nursing note reviewed.   Constitutional:       Appearance: He is well-developed.   HENT:      Head: Normocephalic.      Mouth/Throat:      Mouth: Mucous membranes are moist.   Cardiovascular:      Rate and Rhythm: Normal rate.   Pulmonary:      Effort: Pulmonary effort is normal.      Breath sounds: Normal breath sounds.   Abdominal:      General: Abdomen is flat. Bowel sounds are normal.      Palpations: Abdomen is soft.      Tenderness: There is abdominal tenderness in the right upper quadrant. There is no guarding or rebound.   Skin:     General: Skin is warm and dry.   Neurological:      Mental Status: He is alert and oriented to person, place, and time.               ED Course   Labs Reviewed - No data to display                   MDM     Please note that this report has been produced using speech recognition software and may contain errors related to that system including, but not limited to, errors in grammar, punctuation, and spelling, as well as words and phrases that possibly may have been recognized inappropriately.  If there are any questions or concerns, contact the dictating provider for clarification.        Note to patient: The 21st Century Cures Act makes medical notes like these available to patients in the interest of transparency. However, this is a medical document intended as peer to peer communication. It is written in medical language and may contain abbreviations or verbiage that are unfamiliar. It may appear blunt or direct. Medical documents are intended to carry relevant information, facts as evident, and the clinical opinion of the practitioner.                                    Medical Decision Making  Differential diagnosis includes but is not limited to: Appendicitis, colitis, diverticulitis, enteritis, obstruction, constipation, pancreatitis, hepatitis, cholecystitis      Patient presents today with complaints of right upper quadrant pain that started this morning.  Denies any fever or chills.  No nausea vomiting.  Has had diarrhea but is currently taking MiraLAX for history of constipation.  Was seen here 2 weeks ago and diagnosed with this.  On exam did have tenderness to the right upper quadrant with palpation without guarding.  Explained to patient and mom could still be constipation however did suggest doing ultrasound to rule out any cholecystitis, hepatitis or issues with pancreas.  Mom states she has to take her  to surgery by 9:00.  Explained that we cannot get the testing done in that time.  Mom says that her  has no other way to get the surgery.  Did agree to sign AGAINST MEDICAL ADVICE and states will have him evaluated soon as possible.  Does suggest going to the ER for lab testing that we cannot do here.  Mom and patient both verbalized understanding and agreed to plan of care.    Amount and/or Complexity of Data Reviewed  Independent Historian: parent        Disposition and Plan     Clinical Impression:  1. Abdominal pain of unknown etiology         Disposition:  [facility]  4/26/2024  8:25 am    Follow-up:  No follow-up provider specified.        Medications Prescribed:  Discharge Medication List as of 4/26/2024  8:25 AM                                          "5\")   Wt 76.2 kg (168 lb)   SpO2 96%   BMI 27.96 kg/m    General: NAD, alert, cooperative  Head: normocephalic, without abnormality / atraumatic  Abdomen: soft, non- tender,non- distended. no suprapubic fullness/tenderness. No bilateral CVA tenderness noted.   Geniturinary: deferred     Skin: no rashes or lesions  Musculoskeletal: moves all four extremities equally  Psychological: alert and oriented, answers questions appropriately    Labs  Lab Results   Component Value Date    WBC 5.2 10/09/2023    HGB 15.2 10/09/2023    HCT 45.4 10/09/2023     10/09/2023    CHOL 158 06/24/2010    TRIG 49 06/24/2010    HDL 52 06/24/2010    ALT 19 10/09/2023    AST 23 10/09/2023     10/09/2023    BUN 7.2 10/09/2023    CO2 28 10/09/2023    TSH 2.28 10/16/2008     UA RESULTS:  Recent Labs   Lab Test 10/08/23  2101 07/02/23  0547 06/26/21  0942   COLOR Light Orange*   < > Light Yellow   APPEARANCE Cloudy*   < > Clear   URINEGLC 30*   < > Negative   URINEBILI Negative   < > Negative   URINEKETONE Negative   < > Negative   SG 1.019   < > 1.022   UBLD >1.0 mg/dL*   < > 0.03 mg/dL*   URINEPH 6.0   < > 6.0   PROTEIN 200*   < > Negative   UROBILINOGEN  --   --  <2.0 mg/dL   NITRITE Negative   < > Negative   LEUKEST 500 Thang/uL*   < > Negative   RBCU >182*   < > 6*   WBCU >182*   < > 1    < > = values in this interval not displayed.       Cultures:  Urine Culture: pending    Lab Results: personally reviewed     Imaging:  EXAM: CT ABDOMEN PELVIS W/O CONTRAST  LOCATION: [facility]  DATE: 10/8/2023     INDICATION: hx of bilateral stones  and  stent placement, concern for pyelo. Hematuria.  COMPARISON: 08/21/2023.  TECHNIQUE: CT scan of the abdomen and pelvis was performed without IV contrast. Multiplanar reformats were obtained. Dose reduction techniques were used.  CONTRAST: None.     FINDINGS:   LOWER CHEST: There is mild dependent atelectasis at the lung bases. Small hiatal hernia.     HEPATOBILIARY: " Normal.     PANCREAS: Normal.     SPLEEN: Normal.     ADRENAL GLANDS: Normal.     KIDNEYS/BLADDER: There are bilateral double-J ureteral stents in place. There is moderate dilatation of right renal collecting system and mild dilatation of the left renal collecting system. There are 4 stones within the right kidney measuring up to 6   mm. There is a single 5 mm stone in the lower pole of the left kidney. There are no ureteral stones. Urinary bladder is moderately distended.     BOWEL: There is no bowel obstruction or inflammation. The appendix is normal. There is no free intraperitoneal gas or fluid.     LYMPH NODES: Normal.     VASCULATURE: Unremarkable.     PELVIC ORGANS: Normal.     MUSCULOSKELETAL: Normal.                                                                      IMPRESSION:   1.  Bilateral ureteral stents in place. There is moderate dilatation of the right renal collecting system and mild dilatation of the left renal collecting system. The urinary bladder is moderately distended.  2.  Few bilateral intrarenal stones.  3.  Small hiatal hernia.     EXAM: US RENAL COMPLETE NON-VASCULAR  LOCATION: [facility]  DATE: 10/9/2023     INDICATION: Bilateral hydro with stents on CT, assess for resolution  COMPARISON: CT 10/08/2023.  TECHNIQUE: Routine Bilateral Renal and Bladder Ultrasound.     FINDINGS:     RIGHT KIDNEY: 11.3 x 6.3 x 6.9 cm. There is moderate right hydronephrosis. Stones in the lower pole. Stent in the renal pelvis.     LEFT KIDNEY: 11.2 x 4.7 x 5.1 cm. There is mild hydronephrosis. Stone in the lower pole. Stent in the renal pelvis.     BLADDER: Ureteral stents in the bladder. The bladder is nearly empty.                                                                      IMPRESSION:  1.  Moderate right and mild left hydronephrosis.    I have personally reviewed the imaging reports above.       Assessment/plan  Keshawn Jimenez is being seen by [facility] for  bilateral hydronephrosis in setting of bilateral ureteral stents     - 33 yr old male with bilateral nephrolithiasis s/p cystoscopy, right URS with HLL and basket extraction, left URS with ureteral dilation, bilateral ureteral stent placement 9/18; Now admitted with likely UTI, bilateral hydronephrosis on CT and renal US this AM.     - UA is suggestive of infection (some squamous noted, may be contaminated). UC pending. WBC WNL. Afebrile. Continue broad spectrum antibiotic, adjust as needed pending final culture/sensitivities. If growth, recommend completing 7-10 day antibiotic course.   - Pt's abdominal and bilateral flank flank pain has resolved.   - Creatinine 0.95 (1.17 on 10/8). Monitor.   - Bladder distended on CT, however, post CT PVR and PVRs this AM have been low.   - Will discuss options of stent exchange vs monitoring with Dr. Avina given resolution of pain, creat and WBC WNL, afebrile. Keep NPO for now.   - Old records reviewed - EPIC.      Thank you for consulting [facility] regarding this patient's care. Please contact us with questions or concerns.     PRETTY Winkler, CNP  [facility]  Office Phone: #[phone]    ADDENDUM, 1525:   Case reviewed with Dr. Avina. Hydronephrosis likely 2/2 reflux through stents. Since pain has resolved, remains without nausea, creatinine WNL, PVRs low and remains afebrile without leukocytosis, ok to resume regular diet and patient may discharge home today. Recommend po Cefdinir at discharge until 10/13 surgery. Updated RN and she will update patient and attending.     Bandar Osorio, PRETTY, CNP

## 2024-09-07 ENCOUNTER — HEALTH MAINTENANCE LETTER (OUTPATIENT)
Age: 34
End: 2024-09-07

## 2025-01-06 ENCOUNTER — TRANSFERRED RECORDS (OUTPATIENT)
Dept: HEALTH INFORMATION MANAGEMENT | Facility: CLINIC | Age: 35
End: 2025-01-06
Payer: COMMERCIAL

## 2025-03-01 ENCOUNTER — HOSPITAL ENCOUNTER (EMERGENCY)
Facility: CLINIC | Age: 35
Discharge: HOME | End: 2025-03-01
Attending: EMERGENCY MEDICINE | Admitting: EMERGENCY MEDICINE
Payer: COMMERCIAL

## 2025-03-01 ENCOUNTER — APPOINTMENT (OUTPATIENT)
Dept: CT IMAGING | Facility: CLINIC | Age: 35
End: 2025-03-01
Attending: EMERGENCY MEDICINE
Payer: COMMERCIAL

## 2025-03-01 VITALS
HEIGHT: 65 IN | RESPIRATION RATE: 18 BRPM | TEMPERATURE: 99.5 F | OXYGEN SATURATION: 94 % | DIASTOLIC BLOOD PRESSURE: 79 MMHG | WEIGHT: 175 LBS | BODY MASS INDEX: 29.16 KG/M2 | HEART RATE: 116 BPM | SYSTOLIC BLOOD PRESSURE: 115 MMHG

## 2025-03-01 DIAGNOSIS — R10.9 FLANK PAIN: ICD-10-CM

## 2025-03-01 LAB
ALBUMIN SERPL BCG-MCNC: 4.3 G/DL (ref 3.5–5.2)
ALBUMIN UR-MCNC: NEGATIVE MG/DL
ALP SERPL-CCNC: 42 U/L (ref 40–150)
ALT SERPL W P-5'-P-CCNC: 68 U/L (ref 0–70)
ANION GAP SERPL CALCULATED.3IONS-SCNC: 10 MMOL/L (ref 7–15)
APPEARANCE UR: CLEAR
AST SERPL W P-5'-P-CCNC: 42 U/L (ref 0–45)
BASOPHILS # BLD AUTO: 0 10E3/UL (ref 0–0.2)
BASOPHILS NFR BLD AUTO: 0 %
BILIRUB SERPL-MCNC: 0.7 MG/DL
BILIRUB UR QL STRIP: NEGATIVE
BUN SERPL-MCNC: 19.6 MG/DL (ref 6–20)
CALCIUM SERPL-MCNC: 8.9 MG/DL (ref 8.8–10.4)
CHLORIDE SERPL-SCNC: 101 MMOL/L (ref 98–107)
COLOR UR AUTO: ABNORMAL
CREAT SERPL-MCNC: 0.82 MG/DL (ref 0.67–1.17)
EGFRCR SERPLBLD CKD-EPI 2021: >90 ML/MIN/1.73M2
EOSINOPHIL # BLD AUTO: 0.1 10E3/UL (ref 0–0.7)
EOSINOPHIL NFR BLD AUTO: 1 %
ERYTHROCYTE [DISTWIDTH] IN BLOOD BY AUTOMATED COUNT: 11.7 % (ref 10–15)
FLUAV RNA SPEC QL NAA+PROBE: NEGATIVE
FLUBV RNA RESP QL NAA+PROBE: NEGATIVE
GLUCOSE SERPL-MCNC: 121 MG/DL (ref 70–99)
GLUCOSE UR STRIP-MCNC: NEGATIVE MG/DL
HCO3 SERPL-SCNC: 27 MMOL/L (ref 22–29)
HCT VFR BLD AUTO: 48.5 % (ref 40–53)
HGB BLD-MCNC: 16.9 G/DL (ref 13.3–17.7)
HGB UR QL STRIP: NEGATIVE
IMM GRANULOCYTES # BLD: 0 10E3/UL
IMM GRANULOCYTES NFR BLD: 0 %
KETONES UR STRIP-MCNC: NEGATIVE MG/DL
LEUKOCYTE ESTERASE UR QL STRIP: NEGATIVE
LIPASE SERPL-CCNC: 17 U/L (ref 13–60)
LYMPHOCYTES # BLD AUTO: 0.4 10E3/UL (ref 0.8–5.3)
LYMPHOCYTES NFR BLD AUTO: 4 %
MCH RBC QN AUTO: 30.7 PG (ref 26.5–33)
MCHC RBC AUTO-ENTMCNC: 34.8 G/DL (ref 31.5–36.5)
MCV RBC AUTO: 88 FL (ref 78–100)
MONOCYTES # BLD AUTO: 0.4 10E3/UL (ref 0–1.3)
MONOCYTES NFR BLD AUTO: 4 %
MUCOUS THREADS #/AREA URNS LPF: PRESENT /LPF
NEUTROPHILS # BLD AUTO: 9.4 10E3/UL (ref 1.6–8.3)
NEUTROPHILS NFR BLD AUTO: 91 %
NITRATE UR QL: NEGATIVE
NRBC # BLD AUTO: 0 10E3/UL
NRBC BLD AUTO-RTO: 0 /100
PH UR STRIP: 7.5 [PH] (ref 5–7)
PLATELET # BLD AUTO: 202 10E3/UL (ref 150–450)
POTASSIUM SERPL-SCNC: 4.3 MMOL/L (ref 3.4–5.3)
PROT SERPL-MCNC: 7.6 G/DL (ref 6.4–8.3)
RBC # BLD AUTO: 5.51 10E6/UL (ref 4.4–5.9)
RBC URINE: 4 /HPF
RSV RNA SPEC NAA+PROBE: NEGATIVE
SARS-COV-2 RNA RESP QL NAA+PROBE: NEGATIVE
SODIUM SERPL-SCNC: 138 MMOL/L (ref 135–145)
SP GR UR STRIP: 1.02 (ref 1–1.03)
UROBILINOGEN UR STRIP-MCNC: <2 MG/DL
WBC # BLD AUTO: 10.3 10E3/UL (ref 4–11)
WBC URINE: 1 /HPF

## 2025-03-01 PROCEDURE — 96374 THER/PROPH/DIAG INJ IV PUSH: CPT

## 2025-03-01 PROCEDURE — 85004 AUTOMATED DIFF WBC COUNT: CPT | Performed by: EMERGENCY MEDICINE

## 2025-03-01 PROCEDURE — 96376 TX/PRO/DX INJ SAME DRUG ADON: CPT

## 2025-03-01 PROCEDURE — 80053 COMPREHEN METABOLIC PANEL: CPT | Performed by: EMERGENCY MEDICINE

## 2025-03-01 PROCEDURE — 83690 ASSAY OF LIPASE: CPT | Performed by: EMERGENCY MEDICINE

## 2025-03-01 PROCEDURE — 87637 SARSCOV2&INF A&B&RSV AMP PRB: CPT | Performed by: EMERGENCY MEDICINE

## 2025-03-01 PROCEDURE — 258N000003 HC RX IP 258 OP 636: Performed by: EMERGENCY MEDICINE

## 2025-03-01 PROCEDURE — 74176 CT ABD & PELVIS W/O CONTRAST: CPT

## 2025-03-01 PROCEDURE — 99285 EMERGENCY DEPT VISIT HI MDM: CPT | Mod: 25

## 2025-03-01 PROCEDURE — 250N000011 HC RX IP 250 OP 636: Performed by: EMERGENCY MEDICINE

## 2025-03-01 PROCEDURE — 36415 COLL VENOUS BLD VENIPUNCTURE: CPT | Performed by: EMERGENCY MEDICINE

## 2025-03-01 PROCEDURE — 81001 URINALYSIS AUTO W/SCOPE: CPT | Performed by: EMERGENCY MEDICINE

## 2025-03-01 PROCEDURE — 96361 HYDRATE IV INFUSION ADD-ON: CPT

## 2025-03-01 PROCEDURE — 96375 TX/PRO/DX INJ NEW DRUG ADDON: CPT

## 2025-03-01 RX ORDER — LIDOCAINE 50 MG/G
1 PATCH TOPICAL EVERY 24 HOURS
Qty: 15 PATCH | Refills: 0 | Status: SHIPPED | OUTPATIENT
Start: 2025-03-01

## 2025-03-01 RX ORDER — NAPROXEN 500 MG/1
500 TABLET ORAL 2 TIMES DAILY WITH MEALS
Qty: 16 TABLET | Refills: 0 | Status: SHIPPED | OUTPATIENT
Start: 2025-03-01 | End: 2025-03-09

## 2025-03-01 RX ORDER — HYDROMORPHONE HYDROCHLORIDE 1 MG/ML
0.5 INJECTION, SOLUTION INTRAMUSCULAR; INTRAVENOUS; SUBCUTANEOUS
Status: COMPLETED | OUTPATIENT
Start: 2025-03-01 | End: 2025-03-01

## 2025-03-01 RX ORDER — KETOROLAC TROMETHAMINE 15 MG/ML
15 INJECTION, SOLUTION INTRAMUSCULAR; INTRAVENOUS ONCE
Status: COMPLETED | OUTPATIENT
Start: 2025-03-01 | End: 2025-03-01

## 2025-03-01 RX ORDER — ONDANSETRON 4 MG/1
4 TABLET, ORALLY DISINTEGRATING ORAL EVERY 8 HOURS PRN
Qty: 10 TABLET | Refills: 0 | Status: SHIPPED | OUTPATIENT
Start: 2025-03-01 | End: 2025-03-04

## 2025-03-01 RX ORDER — ONDANSETRON 2 MG/ML
4 INJECTION INTRAMUSCULAR; INTRAVENOUS EVERY 30 MIN PRN
Status: DISCONTINUED | OUTPATIENT
Start: 2025-03-01 | End: 2025-03-02 | Stop reason: HOSPADM

## 2025-03-01 RX ADMIN — KETOROLAC TROMETHAMINE 15 MG: 15 INJECTION, SOLUTION INTRAMUSCULAR; INTRAVENOUS at 21:13

## 2025-03-01 RX ADMIN — SODIUM CHLORIDE 1000 ML: 0.9 INJECTION, SOLUTION INTRAVENOUS at 19:55

## 2025-03-01 RX ADMIN — HYDROMORPHONE HYDROCHLORIDE 0.5 MG: 1 INJECTION, SOLUTION INTRAMUSCULAR; INTRAVENOUS; SUBCUTANEOUS at 20:08

## 2025-03-01 RX ADMIN — HYDROMORPHONE HYDROCHLORIDE 1 MG: 1 INJECTION, SOLUTION INTRAMUSCULAR; INTRAVENOUS; SUBCUTANEOUS at 22:28

## 2025-03-01 RX ADMIN — ONDANSETRON 4 MG: 2 INJECTION, SOLUTION INTRAMUSCULAR; INTRAVENOUS at 20:07

## 2025-03-01 ASSESSMENT — ACTIVITIES OF DAILY LIVING (ADL)
ADLS_ACUITY_SCORE: 46

## 2025-03-02 ENCOUNTER — HOSPITAL ENCOUNTER (EMERGENCY)
Facility: CLINIC | Age: 35
Discharge: HOME | End: 2025-03-02
Attending: EMERGENCY MEDICINE | Admitting: EMERGENCY MEDICINE
Payer: COMMERCIAL

## 2025-03-02 ENCOUNTER — APPOINTMENT (OUTPATIENT)
Dept: CT IMAGING | Facility: CLINIC | Age: 35
End: 2025-03-02
Attending: EMERGENCY MEDICINE
Payer: COMMERCIAL

## 2025-03-02 VITALS
SYSTOLIC BLOOD PRESSURE: 107 MMHG | HEIGHT: 65 IN | WEIGHT: 175 LBS | OXYGEN SATURATION: 95 % | BODY MASS INDEX: 29.16 KG/M2 | HEART RATE: 105 BPM | RESPIRATION RATE: 18 BRPM | DIASTOLIC BLOOD PRESSURE: 72 MMHG | TEMPERATURE: 98.5 F

## 2025-03-02 DIAGNOSIS — R10.31 RIGHT LOWER QUADRANT ABDOMINAL PAIN: ICD-10-CM

## 2025-03-02 DIAGNOSIS — R11.2 NAUSEA AND VOMITING, UNSPECIFIED VOMITING TYPE: ICD-10-CM

## 2025-03-02 DIAGNOSIS — R19.7 DIARRHEA, UNSPECIFIED TYPE: ICD-10-CM

## 2025-03-02 LAB
ALBUMIN SERPL BCG-MCNC: 4 G/DL (ref 3.5–5.2)
ALBUMIN UR-MCNC: 10 MG/DL
ALP SERPL-CCNC: ABNORMAL U/L
ALT SERPL W P-5'-P-CCNC: ABNORMAL U/L
AMPHETAMINES UR QL SCN: NORMAL
ANION GAP SERPL CALCULATED.3IONS-SCNC: 13 MMOL/L (ref 7–15)
APPEARANCE UR: CLEAR
AST SERPL W P-5'-P-CCNC: ABNORMAL U/L
BARBITURATES UR QL SCN: NORMAL
BASOPHILS # BLD AUTO: 0 10E3/UL (ref 0–0.2)
BASOPHILS NFR BLD AUTO: 0 %
BENZODIAZ UR QL SCN: NORMAL
BILIRUB SERPL-MCNC: 1.1 MG/DL
BILIRUB UR QL STRIP: NEGATIVE
BUN SERPL-MCNC: 17.2 MG/DL (ref 6–20)
BZE UR QL SCN: NORMAL
CALCIUM SERPL-MCNC: 8.7 MG/DL (ref 8.8–10.4)
CANNABINOIDS UR QL SCN: NORMAL
CHLORIDE SERPL-SCNC: 101 MMOL/L (ref 98–107)
COLOR UR AUTO: YELLOW
CREAT SERPL-MCNC: 0.85 MG/DL (ref 0.67–1.17)
CRP SERPL-MCNC: 68.4 MG/L
EGFRCR SERPLBLD CKD-EPI 2021: >90 ML/MIN/1.73M2
EOSINOPHIL # BLD AUTO: 0 10E3/UL (ref 0–0.7)
EOSINOPHIL NFR BLD AUTO: 0 %
ERYTHROCYTE [DISTWIDTH] IN BLOOD BY AUTOMATED COUNT: 11.9 % (ref 10–15)
FENTANYL UR QL: NORMAL
GLUCOSE SERPL-MCNC: 119 MG/DL (ref 70–99)
GLUCOSE UR STRIP-MCNC: NEGATIVE MG/DL
HCO3 SERPL-SCNC: 23 MMOL/L (ref 22–29)
HCT VFR BLD AUTO: 47.8 % (ref 40–53)
HGB BLD-MCNC: 16.1 G/DL (ref 13.3–17.7)
HGB UR QL STRIP: NEGATIVE
IMM GRANULOCYTES # BLD: 0 10E3/UL
IMM GRANULOCYTES NFR BLD: 0 %
KETONES UR STRIP-MCNC: NEGATIVE MG/DL
LEUKOCYTE ESTERASE UR QL STRIP: NEGATIVE
LYMPHOCYTES # BLD AUTO: 0.4 10E3/UL (ref 0.8–5.3)
LYMPHOCYTES NFR BLD AUTO: 5 %
MCH RBC QN AUTO: 30.7 PG (ref 26.5–33)
MCHC RBC AUTO-ENTMCNC: 33.7 G/DL (ref 31.5–36.5)
MCV RBC AUTO: 91 FL (ref 78–100)
MONOCYTES # BLD AUTO: 0.3 10E3/UL (ref 0–1.3)
MONOCYTES NFR BLD AUTO: 3 %
MUCOUS THREADS #/AREA URNS LPF: PRESENT /LPF
NEUTROPHILS # BLD AUTO: 8.4 10E3/UL (ref 1.6–8.3)
NEUTROPHILS NFR BLD AUTO: 92 %
NITRATE UR QL: NEGATIVE
NRBC # BLD AUTO: 0 10E3/UL
NRBC BLD AUTO-RTO: 0 /100
OPIATES UR QL SCN: NORMAL
PCP QUAL URINE (ROCHE): NORMAL
PH UR STRIP: 6 [PH] (ref 5–7)
PLATELET # BLD AUTO: 210 10E3/UL (ref 150–450)
POTASSIUM SERPL-SCNC: 4.8 MMOL/L (ref 3.4–5.3)
PROT SERPL-MCNC: 7.3 G/DL (ref 6.4–8.3)
RBC # BLD AUTO: 5.25 10E6/UL (ref 4.4–5.9)
RBC URINE: 2 /HPF
SODIUM SERPL-SCNC: 137 MMOL/L (ref 135–145)
SP GR UR STRIP: 1.03 (ref 1–1.03)
SQUAMOUS EPITHELIAL: <1 /HPF
UROBILINOGEN UR STRIP-MCNC: <2 MG/DL
WBC # BLD AUTO: 9.2 10E3/UL (ref 4–11)
WBC URINE: 1 /HPF

## 2025-03-02 PROCEDURE — 84155 ASSAY OF PROTEIN SERUM: CPT | Performed by: EMERGENCY MEDICINE

## 2025-03-02 PROCEDURE — 250N000013 HC RX MED GY IP 250 OP 250 PS 637: Performed by: EMERGENCY MEDICINE

## 2025-03-02 PROCEDURE — 99285 EMERGENCY DEPT VISIT HI MDM: CPT | Mod: 25

## 2025-03-02 PROCEDURE — 81001 URINALYSIS AUTO W/SCOPE: CPT | Performed by: EMERGENCY MEDICINE

## 2025-03-02 PROCEDURE — 96375 TX/PRO/DX INJ NEW DRUG ADDON: CPT

## 2025-03-02 PROCEDURE — 80307 DRUG TEST PRSMV CHEM ANLYZR: CPT | Performed by: EMERGENCY MEDICINE

## 2025-03-02 PROCEDURE — 96374 THER/PROPH/DIAG INJ IV PUSH: CPT

## 2025-03-02 PROCEDURE — 81003 URINALYSIS AUTO W/O SCOPE: CPT | Performed by: EMERGENCY MEDICINE

## 2025-03-02 PROCEDURE — 86140 C-REACTIVE PROTEIN: CPT | Performed by: EMERGENCY MEDICINE

## 2025-03-02 PROCEDURE — 74176 CT ABD & PELVIS W/O CONTRAST: CPT

## 2025-03-02 PROCEDURE — 250N000011 HC RX IP 250 OP 636: Performed by: EMERGENCY MEDICINE

## 2025-03-02 PROCEDURE — 36415 COLL VENOUS BLD VENIPUNCTURE: CPT | Performed by: EMERGENCY MEDICINE

## 2025-03-02 PROCEDURE — 85025 COMPLETE CBC W/AUTO DIFF WBC: CPT | Performed by: EMERGENCY MEDICINE

## 2025-03-02 RX ORDER — ONDANSETRON 2 MG/ML
4 INJECTION INTRAMUSCULAR; INTRAVENOUS ONCE
Status: COMPLETED | OUTPATIENT
Start: 2025-03-02 | End: 2025-03-02

## 2025-03-02 RX ORDER — HYOSCYAMINE SULFATE 0.12 MG/1
0.12 TABLET SUBLINGUAL EVERY 4 HOURS PRN
Qty: 10 TABLET | Refills: 0 | Status: SHIPPED | OUTPATIENT
Start: 2025-03-02

## 2025-03-02 RX ORDER — HYOSCYAMINE SULFATE 0.12 MG/1
125 TABLET SUBLINGUAL EVERY 4 HOURS PRN
Status: DISCONTINUED | OUTPATIENT
Start: 2025-03-02 | End: 2025-03-02 | Stop reason: HOSPADM

## 2025-03-02 RX ORDER — ACETAMINOPHEN 325 MG/1
975 TABLET ORAL ONCE
Status: COMPLETED | OUTPATIENT
Start: 2025-03-02 | End: 2025-03-02

## 2025-03-02 RX ORDER — KETOROLAC TROMETHAMINE 15 MG/ML
15 INJECTION, SOLUTION INTRAMUSCULAR; INTRAVENOUS ONCE
Status: COMPLETED | OUTPATIENT
Start: 2025-03-02 | End: 2025-03-02

## 2025-03-02 RX ADMIN — KETOROLAC TROMETHAMINE 15 MG: 15 INJECTION, SOLUTION INTRAMUSCULAR; INTRAVENOUS at 07:22

## 2025-03-02 RX ADMIN — ONDANSETRON 4 MG: 2 INJECTION, SOLUTION INTRAMUSCULAR; INTRAVENOUS at 07:22

## 2025-03-02 RX ADMIN — ACETAMINOPHEN 975 MG: 325 TABLET ORAL at 07:02

## 2025-03-02 RX ADMIN — HYOSCYAMINE SULFATE 125 MCG: 0.12 TABLET SUBLINGUAL at 08:16

## 2025-03-02 ASSESSMENT — ACTIVITIES OF DAILY LIVING (ADL)
ADLS_ACUITY_SCORE: 50
ADLS_ACUITY_SCORE: 46
ADLS_ACUITY_SCORE: 50

## 2025-03-02 ASSESSMENT — ENCOUNTER SYMPTOMS
VOMITING: 1
DIARRHEA: 1
FEVER: 1
NAUSEA: 1
ABDOMINAL PAIN: 1
FLANK PAIN: 1

## 2025-03-02 ASSESSMENT — COLUMBIA-SUICIDE SEVERITY RATING SCALE - C-SSRS
2. HAVE YOU ACTUALLY HAD ANY THOUGHTS OF KILLING YOURSELF IN THE PAST MONTH?: NO
1. IN THE PAST MONTH, HAVE YOU WISHED YOU WERE DEAD OR WISHED YOU COULD GO TO SLEEP AND NOT WAKE UP?: NO
6. HAVE YOU EVER DONE ANYTHING, STARTED TO DO ANYTHING, OR PREPARED TO DO ANYTHING TO END YOUR LIFE?: NO

## 2025-03-02 NOTE — ED TRIAGE NOTES
Patient arrives to the ER with c/o N/V/D and right sided flank/abdominal pain. He has been taking care of his niece who had the stomach bug, and now he has started to have symptoms. He also has a history of kidney stones and he feels like he has a kidney stone currently with 10/10 pain.     Triage Assessment (Adult)       Row Name 03/01/25 1922          Triage Assessment    Airway WDL WDL        Respiratory WDL    Respiratory WDL WDL        Skin Circulation/Temperature WDL    Skin Circulation/Temperature WDL WDL        Cardiac WDL    Cardiac WDL WDL        Peripheral/Neurovascular WDL    Peripheral Neurovascular WDL WDL

## 2025-03-02 NOTE — ED NOTES
Pt says [place] has helped him and took the edge of, pain went to as low as 5/10. Shortly after coming back from CT, pain is back at 8/10. Provider notified. Toradol administered.     HR has gone down from 130s- to low 110s.

## 2025-03-02 NOTE — ED TRIAGE NOTES
Pt was seen here yesterday for flank and abdominal pain. Thought he may have a kidney stone, but was ruled out. Pt reports persistent pain and bloating sensation to RLQ abdomen which radiates around his back. Has pain to L side as well, but not as intense.      Triage Assessment (Adult)       Row Name 03/02/25 0623          Triage Assessment    Airway WDL WDL        Respiratory WDL    Respiratory WDL WDL        Skin Circulation/Temperature WDL    Skin Circulation/Temperature WDL WDL        Cardiac WDL    Cardiac WDL WDL        Peripheral/Neurovascular WDL    Peripheral Neurovascular WDL WDL        Cognitive/Neuro/Behavioral WDL    Cognitive/Neuro/Behavioral WDL WDL

## 2025-03-02 NOTE — ED PROVIDER NOTES
Emergency Department Encounter      NAME: Keshawn Jimenez  AGE: 34 year old male  YOB: 1990  MRN: [id number]  EVALUATION DATE & TIME: 3/2/2025  6:19 AM    PCP: No Ref-Primary, Physician    ED PROVIDER: Deacon Krishnan M.D.      Chief Complaint   Patient presents with    Abdominal Pain    Back Pain         FINAL IMPRESSION:  1. Right lower quadrant abdominal pain    2. Nausea and vomiting, unspecified vomiting type    3. Diarrhea, unspecified type        MEDICAL DECISION MAKIN:38 AM I met with the patient, obtained history, performed an initial exam, and discussed options and plan for diagnostics and treatment here in the ED.   7:48 AM I rechecked and talked with the patient.   9:34 AM I rechecked and talked with the patient. Discussed his heroin,  and methadone use and that he should stay with taking non-narcotic pain medications.      This patient is a 34-year-old male with a history of heroin use and amphetamine abuse who is on methadone and presents with abdominal pain.  I reviewed the patient's medical records from his urology visit on  when he saw Seamus Norris, physician assistant, for his kidney stones.  He also was seen yesterday and I reviewed his note from  saw Dr. Cruz for his abdominal pain.  A workup at that time showed nonobstructing kidney stones and he was discharged home with prescriptions for Naprosyn lidocaine patches and Zofran.  He returns today because he is continue to have right lower quadrant abdominal pain that radiates to right flank and complains of a bloated sensation.  He has had vomiting 8-10 times today and a couple episodes of diarrhea.  He says it had a low-grade temperature.  On exam he is tender to palpation right lower quadrant which is worse with percussion and he said it was worse when the car over bumps on the way to the ER.  As far as infectious contacts go his niece has had a vomiting and diarrheal illness as well as the  patient's brother and sister-in-law.  I suspected that the patient had a viral gastroenteritis or norovirus that would explain his symptoms.  The patient's pain was treated in the ER with Zofran for his nausea and Toradol for his pain.  He was given a dose of Tylenol as well.  He continued to complain of pain and his CRP was noted to be markedly elevated at 68.4.  I discussed the fact that he had a negative CT yesterday but he was concerned that he could have developed appendicitis and a repeat CT scan of his abdomen was again negative for appendicitis.  It still showed a stable nonobstructing kidney stones.  I independently reviewed and interpreted the CT.  His other lab work including a urine drug screen, urinalysis, hepatic profile, basic metabolic profile and CBC were unremarkable.  He continued to complain of pain and wanted narcotic pain medication.  I explained to him that considering his history of heroin addiction and amphetamine dependence as well as the fact that he is on methadone made it a bad idea for him to be given narcotics in the ER today.  He is going to continue using NSAIDs, Tylenol and then lidocaine for his pain.    Pertinent Labs & Imaging studies reviewed. (See chart for details)    The importance of close follow up was discussed. We reviewed warning signs and symptoms, and I instructed Mr. Jimenez to return to the emergency department immediately if he develops any new or worsening symptoms. I provided additional verbal discharge instructions. Mr. Jimenez expressed understanding and agreement with this plan of care, his questions were answered, and he was discharged in stable condition.     Medical Decision Making     History:  Supplemental history from: Documented in chart, if applicable  External Record(s) reviewed: Documented in chart, if applicable.     Work Up:  Chart documentation includes differential considered and any EKGs or imaging independently interpreted by provider, where  specified.  In additional to work up documented, I considered the following work up: Documented in chart, if applicable.     External consultation:  Discussion of management with another provider: Documented in chart, if applicable     Complicating factors:  Care impacted by chronic illness: tobacco usage, heroin abuse, amphetamine dependence/abuse  Care affected by social determinants of health: Access to Medical Care     Disposition considerations: discharge       MEDICATIONS GIVEN IN THE EMERGENCY:  Medications   ondansetron (ZOFRAN) injection 4 mg (4 mg Intravenous $Given 3/2/25 0722)   ketorolac (TORADOL) injection 15 mg (15 mg Intravenous $Given 3/2/25 0722)   acetaminophen (TYLENOL) tablet 975 mg (975 mg Oral $Given 3/2/25 0702)       NEW PRESCRIPTIONS STARTED AT TODAY'S ER VISIT:  Discharge Medication List as of 3/2/2025  9:38 AM        START taking these medications    Details   hyoscyamine (LEVSIN/SL) 0.125 MG sublingual tablet Place 1 tablet (0.125 mg) under the tongue every 4 hours as needed for cramping or diarrhea., Disp-10 tablet, R-0, Local Print                =================================================================    HPI    Patient information was obtained from: patient     Use of : N/A       Keshawn Jimenez is a 34 year old male with a past medical history of kidney stone, uretal stent, UTI, chronic constipation, tobacco usage, heroin abuse, amphetamine dependence/abuse, and depressive disorder who presents abdominal pain and flank pain.     Patient reports that he was here yesterday where they ruled out any obstructing kidney stones. He comes in again as he has been having persistent sharp right lower quadrant abdominal pain that radiates to his right flank. Says he feels bloated on the right side of his abdomen. He also endorses some left lower quadrant abdominal pain. He says that his pain started yesterday at around 1600. The pain is better with walking and worse when he  is in the car going over road bumps.     He further reports fever, nausea, vomit, and diarrhea. 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 He vomited 8-10x and had diarrhea 2x today. He describes his stools as watery.     Denies any recent travel and any other symptoms or complaints at this time.     Per chart review:  3/1/2025: Patient was seen here yesterday for flank pain. CT imaging showed nonobstructing stones but no ureteral stones or hydronephrosis, there was no evidence for bowel obstruction or inflammatory changes. There was nothing to suggest pyelonephritis with benign urine and CT imaging. Discharged with a prescription for naproxen, Lidoderm patches, and Zofran.       REVIEW OF SYSTEMS   Review of Systems   Constitutional:  Positive for fever.   Gastrointestinal:  Positive for abdominal pain, diarrhea, nausea and vomiting.   Genitourinary:  Positive for flank pain.        PAST MEDICAL HISTORY:  Past Medical History:   Diagnosis Date    Chemical dependency (H)     Depressive disorder     History of amphetamine dependence/abuse (H)     History of heroin abuse (H)          Kidney stone     right       PAST SURGICAL HISTORY:  History reviewed. No pertinent surgical history.    CURRENT MEDICATIONS:    No current facility-administered medications for this encounter.    Current Outpatient Medications:     hyoscyamine (LEVSIN/SL) 0.125 MG sublingual tablet, Place 1 tablet (0.125 mg) under the tongue every 4 hours as needed for cramping or diarrhea., Disp: 10 tablet, Rfl: 0    lidocaine (LIDODERM) 5 % patch, Place 1 patch over 12 hours onto the skin every 24 hours. To prevent lidocaine toxicity, patient should be patch free for 12 hrs daily., Disp: 15 patch, Rfl: 0    naproxen (NAPROSYN) 500 MG tablet, Take 1 tablet (500 mg) by mouth 2 times daily (with meals) for 8 days., Disp: 16 tablet, Rfl: 0    ondansetron (ZOFRAN ODT) 4 MG ODT tab, Take 1  tablet (4 mg) by mouth every 8 hours as needed., Disp: 10 tablet, Rfl: 0    ALLERGIES:  Allergies   Allergen Reactions    Iodinated Contrast Media Other (See Comments)     Had CT for kidney stones 7/2/2023 and had throat itching (Isovue)       FAMILY HISTORY:  Family History   Problem Relation Age of Onset    Depression Mother     Mental Illness Mother     Heart Disease Mother         pacemaker    Depression Maternal Aunt     Mental Illness Maternal Aunt     Mental Illness Paternal Uncle     Schizophrenia Paternal Uncle     Mental Illness Paternal Uncle     Schizophrenia Paternal Uncle     Aortic aneurysm Father     Hypertension Father        SOCIAL HISTORY:   Social History     Socioeconomic History    Marital status: Single   Tobacco Use    Smoking status: Every Day     Current packs/day: 0.50     Average packs/day: 0.5 packs/day for 6.0 years (3.0 ttl pk-yrs)     Types: Cigarettes     Passive exposure: Current    Smokeless tobacco: Never   Vaping Use    Vaping status: Never Used   Substance and Sexual Activity    Alcohol use: No    Drug use: No     Types: IV     Comment: Drug use: hx heroin abuse.  sober since 2015     Social Drivers of Health     Financial Resource Strain: Low Risk  (10/11/2023)    Financial Resource Strain     Within the past 12 months, have you or your family members you live with been unable to get utilities (heat, electricity) when it was really needed?: No   Food Insecurity: Low Risk  (10/11/2023)    Food Insecurity     Within the past 12 months, did you worry that your food would run out before you got money to buy more?: No     Within the past 12 months, did the food you bought just not last and you didn t have money to get more?: No   Transportation Needs: Low Risk  (10/11/2023)    Transportation Needs     Within the past 12 months, has lack of transportation kept you from medical appointments, getting your medicines, non-medical meetings or appointments, work, or from getting things  "that you need?: No   Interpersonal Safety: Low Risk  (10/11/2023)    Interpersonal Safety     Do you feel physically and emotionally safe where you currently live?: Yes     Within the past 12 months, have you been hit, slapped, kicked or otherwise physically hurt by someone?: No     Within the past 12 months, have you been humiliated or emotionally abused in other ways by your partner or ex-partner?: No   Housing Stability: High Risk (10/11/2023)    Housing Stability     Do you have housing? : No     Are you worried about losing your housing?: No       PHYSICAL EXAM:    Vitals: /72   Pulse 105   Temp 98.5  F (36.9  C) (Oral)   Resp 18   Ht 1.651 m (5' 5\")   Wt 79.4 kg (175 lb)   SpO2 95%   BMI 29.12 kg/m     Constitutional: Well developed, well nourished. Comfortable appearing.  HEAD:Normocephalic, atraumatic,   ENT: mucous membranes moist, nose normal.   Neck- Supple, gross ROM intact.  No JVD.  No palpable nodes.  Pulmonary: Clear to auscultation bilaterally, no respiratory distress, no wheezing, speaks full sentences easily.  Cardiovascular: Normal heart rate, regular rhythm, no murmurs. No lower extremity edema, 2+ DP pulses.   GI: Soft, Tenderness over the right lower quadrant, not distended, no masses.  No hepatosplenomegaly.  Musculoskeletal: Moving all 4 extremities intentionally and without pain. No obvious deformity.   Back: No CVA tenderness. Tenderness over the right lumbar paraspinal muscles.  Skin: Warm, dry, no rash.  Neurologic: Alert & oriented x 3, speech clear, moving all extremities spontaneously   Psychiatric: Affect normal, cooperative.     LAB:  All pertinent labs reviewed and interpreted.  Labs Ordered and Resulted from Time of ED Arrival to Time of ED Departure   COMPREHENSIVE METABOLIC PANEL - Abnormal       Result Value    Sodium 137      Potassium 4.8      Carbon Dioxide (CO2) 23      Anion Gap 13      Urea Nitrogen 17.2      Creatinine 0.85      GFR Estimate >90      Calcium " 8.7 (*)     Chloride 101      Glucose 119 (*)     Alkaline Phosphatase        AST        ALT        Protein Total 7.3      Albumin 4.0      Bilirubin Total 1.1     CRP INFLAMMATION - Abnormal    CRP Inflammation 68.40 (*)    ROUTINE UA WITH MICROSCOPIC REFLEX TO CULTURE - Abnormal    Color Urine Yellow      Appearance Urine Clear      Glucose Urine Negative      Bilirubin Urine Negative      Ketones Urine Negative      Specific Gravity Urine 1.029      Blood Urine Negative      pH Urine 6.0      Protein Albumin Urine 10 (*)     Urobilinogen Urine <2.0      Nitrite Urine Negative      Leukocyte Esterase Urine Negative      Mucus Urine Present (*)     RBC Urine 2      WBC Urine 1      Squamous Epithelials Urine <1     CBC WITH PLATELETS AND DIFFERENTIAL - Abnormal    WBC Count 9.2      RBC Count 5.25      Hemoglobin 16.1      Hematocrit 47.8      MCV 91      MCH 30.7      MCHC 33.7      RDW 11.9      Platelet Count 210      % Neutrophils 92      % Lymphocytes 5      % Monocytes 3      % Eosinophils 0      % Basophils 0      % Immature Granulocytes 0      NRBCs per 100 WBC 0      Absolute Neutrophils 8.4 (*)     Absolute Lymphocytes 0.4 (*)     Absolute Monocytes 0.3      Absolute Eosinophils 0.0      Absolute Basophils 0.0      Absolute Immature Granulocytes 0.0      Absolute NRBCs 0.0     URINE DRUG SCREEN PANEL - Normal    Amphetamines Urine Screen Negative      Barbituates Urine Screen Negative      Benzodiazepine Urine Screen Negative      Cannabinoids Urine Screen Negative      Cocaine Urine Screen Negative      Fentanyl Qual Urine Screen Negative      Opiates Urine Screen Negative      PCP Urine Screen Negative         RADIOLOGY:  CT Abdomen Pelvis w/o Contrast   Final Result   IMPRESSION:    1.  Stable nonobstructing renal calculi. No hydronephrosis or ureteral calculus.   2.  No specific finding to explain the patient's pain.               ISally, am serving as a scribe to document services personally  performed by Dr. Deacon Krishnan based on my observation and the provider's statements to me. I, Deacon Krishnan M.D. attest that Sally Delgado is acting in a scribe capacity, has observed my performance of the services and has documented them in accordance with my direction.      Deacon Krishnan M.D.  Emergency Medicine  [facility] EMERGENCY ROOM  [address] [facility] [id number]  [phone]  Dept: [phone]      Deacon Krishnan MD  03/02/25 7888

## 2025-03-02 NOTE — ED PROVIDER NOTES
EMERGENCY DEPARTMENT ENCOUNTER      NAME: Keshawn Jimenez  AGE: 34 year old male  YOB: 1990  MRN: [id number]  EVALUATION DATE & TIME: 3/1/2025  7:27 PM    PCP: No Ref-Primary, Physician    ED PROVIDER: Hever Kelley M.D.      Chief Complaint   Patient presents with    Abdominal Pain    Nausea, Vomiting, & Diarrhea         FINAL IMPRESSION:  1. Flank pain          ED COURSE & MEDICAL DECISION MAKIN:55 PM I met with the patient, obtained history, performed an initial exam, and discussed options and plan for diagnostics and treatment here in the ED.    Pertinent Labs & Imaging studies reviewed. (See chart for details)  34 year old male presents to the Emergency Department for evaluation of flank pain. Patient appears non toxic with stable vitals signs patient tachycardic but afebrile with no hypoxia.  Lung sounds clear, abdomen benign.  Reviewed the medical record, reviewed procedure note from [facility] urology on 10/13/2023 patient underwent cystoscopy and bilateral flexible ureteroscopy for left sided basket stone extraction and ureteral stent exchange for history of bilateral kidney stones.  Patient states symptoms today again feel consistent with his history of kidney stones.  Certainly considered viral process given his recent exposure.  Less likely appendicitis, nothing suggest cholecystitis, pancreatitis or diverticulitis, nothing to suggest obstruction, perforation or GI bleed.  We will obtain screening labs, urine studies and CT imaging.  Patient was given fluids, nausea medicine and pain medicine here.    Reassessment: Labs by my independent interpretation showed no signs of acute kidney injury with a creatinine of 0.82, no signs of anemia with a hemoglobin of 16.9, nothing to suggest malicious infection with a white blood cell count of 10.3, nothing to suggest pancreatitis with a lipase of 17.  COVID, influenza and RSV negative.  Urine showed no blood or signs of infection.  CT  imaging returned reported nonobstructing stones but no ureteral stones or hydronephrosis, there was no evidence for bowel obstruction or inflammatory changes.  Patient continued to endorse pain but tachycardia improved, repeat exam was benign, my repeat exam he has no focal right lower quadrant tenderness or guarding.  Considered possible appendicitis though again CT imaging is benign, no fever, no leukocytosis.  Certainly nothing to suggest pyelonephritis with benign urine and CT imaging.  Considered admission but ultimately with negative workup, improved vitals and repeat benign exams feel he is safe for discharge and close follow-up at this time.  Will discharge with a prescription for naproxen, Lidoderm patches and Zofran and recommend close follow-up with primary care in the next 5 to 7 days or will recommend close follow-up with [facility] given his history of kidney stones and reports that this felt like a kidney stone tonight.  Discussed all these findings recommendations with the patient felt reassured comfortable discharge.  Return precaution provided.    Medical Decision Making  Obtained supplemental history:Supplemental history obtained?: Documented in chart and Family Member/Significant Other  Reviewed external records: External records reviewed?: Documented in chart  Care impacted by chronic illness:Alcohol and/or Drug Abuse or Dependence  Did you consider but not order tests?: Work up considered but not performed and documented in chart, if applicable  Did you interpret images independently?: Independent interpretation of ECG and images noted in documentation, when applicable.  Consultation discussion with other provider:Did you involve another provider (consultant, MH, pharmacy, etc.)?: No  Discharge. I prescribed additional prescription strength medication(s) as charted. See documentation for any additional details.    MIPS (CTPE, Dental pain, Gee, Sinusitis, Asthma/COPD, Head Trauma): Not  "Applicable        At the conclusion of the encounter I discussed the results of all of the tests and the disposition. The questions were answered and return precautions provided. The patient or family acknowledged understanding and was agreeable with the care plan.         MEDICATIONS GIVEN IN THE EMERGENCY:  Medications   ondansetron (ZOFRAN) injection 4 mg (4 mg Intravenous $Given 3/1/25 2007)   sodium chloride 0.9% BOLUS 1,000 mL (0 mLs Intravenous Stopped 3/1/25 2135)   HYDROmorphone (PF) (DILAUDID) injection 0.5 mg (0.5 mg Intravenous $Given 3/1/25 2008)   ketorolac (TORADOL) injection 15 mg (15 mg Intravenous $Given 3/1/25 2113)   HYDROmorphone (DILAUDID) injection 1 mg (1 mg Intravenous $Given 3/1/25 2228)       NEW PRESCRIPTIONS STARTED AT TODAY'S ER VISIT  Discharge Medication List as of 3/1/2025 10:33 PM        START taking these medications    Details   lidocaine (LIDODERM) 5 % patch Place 1 patch over 12 hours onto the skin every 24 hours. To prevent lidocaine toxicity, patient should be patch free for 12 hrs daily.Disp-15 patch, R-0Local Print      naproxen (NAPROSYN) 500 MG tablet Take 1 tablet (500 mg) by mouth 2 times daily (with meals) for 8 days., Disp-16 tablet, R-0, Local Print      ondansetron (ZOFRAN ODT) 4 MG ODT tab Take 1 tablet (4 mg) by mouth every 8 hours as needed., Disp-10 tablet, R-0, Local Print                  =================================================================    HPI    Patient information was obtained from: patient     Use of Intrepreter: N/A         Keshawn Jimenez is a 34 year old male who presents flank pain.  Patient states that he awoke this morning feeling slight right-sided abdominal pain and nausea.  Flank pain became progressively worse throughout the day.  Does endorse history of kidney stones with similar symptoms.  That said, he has been taking care of a sick family member who has had \"stomach flu.\"  Patient states he took ibuprofen prior to arrival with " "little improvement.  Otherwise he denies any falls or trauma, chest pain, fevers, skin changes or rashes or focal weakness.        VITALS:  Patient Vitals for the past 24 hrs:   BP Temp Pulse Resp SpO2 Height Weight   03/01/25 2241 -- -- -- -- 94 % -- --   03/01/25 2230 115/79 -- 116 18 93 % -- --   03/01/25 2212 -- -- 115 -- 92 % -- --   03/01/25 2115 -- -- 112 -- 93 % -- --   03/01/25 2039 -- -- 114 -- 96 % -- --   03/01/25 2029 99/69 -- 108 -- -- -- --   03/01/25 2007 113/82 -- 112 -- 95 % -- --   03/01/25 1935 -- -- 119 -- 96 % -- --   03/01/25 1934 127/86 -- 118 -- 95 % -- --   03/01/25 1921 116/82 99.5  F (37.5  C) (!) 132 18 95 % 1.651 m (5' 5\") 79.4 kg (175 lb)        PHYSICAL EXAM    Constitutional:  Awake, alert, in no apparent distress  HENT:  Normocephalic, Atraumatic. Bilateral external ears normal. Oropharynx moist. Nose normal. Neck- Normal range of motion with no guarding, No midline cervical tenderness, Supple, No stridor.   Eyes:  PERRL, EOMI with no signs of entrapment, Conjunctiva normal, No discharge.   Respiratory:  Normal breath sounds, No respiratory distress, No wheezing.    Cardiovascular:  Mild tachycardia, Normal rhythm, No appreciable rubs or gallops.   GI:  Soft, No tenderness, No distension, No palpable masses  Gu: Right CVA tenderness  Musculoskeletal:  Intact distal pulses, No edema. No gross deformities   Integument:  Warm, Dry, No erythema, No rash.   Neurologic:  Alert & oriented, Normal motor function, Normal sensory function, No focal deficits noted.   Psychiatric:  Affect normal, Judgment normal, Mood normal.     LAB:  All pertinent labs reviewed and interpreted.  Results for orders placed or performed during the hospital encounter of 03/01/25   CT Abdomen Pelvis w/o Contrast    Impression    IMPRESSION:   1.  There are 2 nonobstructing stones lower pole left kidney with the largest measuring up to 6 mm. 2 mm nonobstructing stone lower pole right kidney. No ureteral stones or " hydronephrosis.  2.  No evidence for bowel obstruction or inflammatory changes.  3.  Small esophageal hiatal hernia.  4.  Mild bibasilar infiltrates versus atelectasis, greater on the left.       Influenza A/B, RSV and SARS-CoV2 PCR (COVID-19) Nasopharyngeal    Specimen: Nasopharyngeal; Swab   Result Value Ref Range    Influenza A PCR Negative Negative    Influenza B PCR Negative Negative    RSV PCR Negative Negative    SARS CoV2 PCR Negative Negative   Comprehensive metabolic panel   Result Value Ref Range    Sodium 138 135 - 145 mmol/L    Potassium 4.3 3.4 - 5.3 mmol/L    Carbon Dioxide (CO2) 27 22 - 29 mmol/L    Anion Gap 10 7 - 15 mmol/L    Urea Nitrogen 19.6 6.0 - 20.0 mg/dL    Creatinine 0.82 0.67 - 1.17 mg/dL    GFR Estimate >90 >60 mL/min/1.73m2    Calcium 8.9 8.8 - 10.4 mg/dL    Chloride 101 98 - 107 mmol/L    Glucose 121 (H) 70 - 99 mg/dL    Alkaline Phosphatase 42 40 - 150 U/L    AST 42 0 - 45 U/L    ALT 68 0 - 70 U/L    Protein Total 7.6 6.4 - 8.3 g/dL    Albumin 4.3 3.5 - 5.2 g/dL    Bilirubin Total 0.7 <=1.2 mg/dL   Result Value Ref Range    Lipase 17 13 - 60 U/L   UA with Microscopic reflex to Culture    Specimen: Urine, Midstream   Result Value Ref Range    Color Urine Light Yellow Colorless, Straw, Light Yellow, Yellow    Appearance Urine Clear Clear    Glucose Urine Negative Negative mg/dL    Bilirubin Urine Negative Negative    Ketones Urine Negative Negative mg/dL    Specific Gravity Urine 1.024 1.001 - 1.030    Blood Urine Negative Negative    pH Urine 7.5 (H) 5.0 - 7.0    Protein Albumin Urine Negative Negative mg/dL    Urobilinogen Urine <2.0 <2.0 mg/dL    Nitrite Urine Negative Negative    Leukocyte Esterase Urine Negative Negative    Mucus Urine Present (A) None Seen /LPF    RBC Urine 4 (H) <=2 /HPF    WBC Urine 1 <=5 /HPF   CBC with platelets and differential   Result Value Ref Range    WBC Count 10.3 4.0 - 11.0 10e3/uL    RBC Count 5.51 4.40 - 5.90 10e6/uL    Hemoglobin 16.9 13.3 - 17.7  g/dL    Hematocrit 48.5 40.0 - 53.0 %    MCV 88 78 - 100 fL    MCH 30.7 26.5 - 33.0 pg    MCHC 34.8 31.5 - 36.5 g/dL    RDW 11.7 10.0 - 15.0 %    Platelet Count 202 150 - 450 10e3/uL    % Neutrophils 91 %    % Lymphocytes 4 %    % Monocytes 4 %    % Eosinophils 1 %    % Basophils 0 %    % Immature Granulocytes 0 %    NRBCs per 100 WBC 0 <1 /100    Absolute Neutrophils 9.4 (H) 1.6 - 8.3 10e3/uL    Absolute Lymphocytes 0.4 (L) 0.8 - 5.3 10e3/uL    Absolute Monocytes 0.4 0.0 - 1.3 10e3/uL    Absolute Eosinophils 0.1 0.0 - 0.7 10e3/uL    Absolute Basophils 0.0 0.0 - 0.2 10e3/uL    Absolute Immature Granulocytes 0.0 <=0.4 10e3/uL    Absolute NRBCs 0.0 10e3/uL       RADIOLOGY:  CT Abdomen Pelvis w/o Contrast   Final Result   IMPRESSION:    1.  There are 2 nonobstructing stones lower pole left kidney with the largest measuring up to 6 mm. 2 mm nonobstructing stone lower pole right kidney. No ureteral stones or hydronephrosis.   2.  No evidence for bowel obstruction or inflammatory changes.   3.  Small esophageal hiatal hernia.   4.  Mild bibasilar infiltrates versus atelectasis, greater on the left.                   EKG:      I have independently reviewed and interpreted the EKG(s) documented above.    PROCEDURES:        [facility] Documentation:   CMS Diagnoses:       I, HEVER KELLEY MD, am serving as a scribe to document services personally performed by Hever Kelley MD, based on my observation and the provider's statements to me. I, Hever Kelley MD attest that HEVER KELLEY MD is acting in a scribe capacity, has observed my performance of the services and has documented them in accordance with my direction.    Hever Kelley M.D.  Emergency Medicine  [facility] EMERGENCY ROOM  [address] [facility] [id number] [phone]  Dept: [phone]       Hever Kelley MD  03/01/25 6704     Consent (Nose)/Introductory Paragraph: The rationale for Mohs was explained to the patient and consent was obtained. The risks, benefits and alternatives to therapy were discussed in detail. Specifically, the risks of nasal deformity, changes in the flow of air through the nose, infection, scarring, bleeding, prolonged wound healing, incomplete removal, allergy to anesthesia, nerve injury and recurrence were addressed. Prior to the procedure, the treatment site was clearly identified and confirmed by the patient. All components of Universal Protocol/PAUSE Rule completed.

## 2025-03-11 ENCOUNTER — HOSPITAL ENCOUNTER (OUTPATIENT)
Dept: ULTRASOUND IMAGING | Facility: CLINIC | Age: 35
Discharge: HOME | End: 2025-03-11
Attending: PHYSICIAN ASSISTANT | Admitting: PHYSICIAN ASSISTANT
Payer: COMMERCIAL

## 2025-03-11 DIAGNOSIS — Z87.442 PERSONAL HISTORY OF URINARY CALCULI: ICD-10-CM

## 2025-03-11 PROCEDURE — 76770 US EXAM ABDO BACK WALL COMP: CPT

## 2025-03-13 ENCOUNTER — TRANSFERRED RECORDS (OUTPATIENT)
Dept: HEALTH INFORMATION MANAGEMENT | Facility: CLINIC | Age: 35
End: 2025-03-13
Payer: COMMERCIAL

## 2025-04-06 ENCOUNTER — TRANSFERRED RECORDS (OUTPATIENT)
Dept: HEALTH INFORMATION MANAGEMENT | Facility: CLINIC | Age: 35
End: 2025-04-06
Payer: COMMERCIAL

## 2025-04-14 ENCOUNTER — TRANSFERRED RECORDS (OUTPATIENT)
Dept: HEALTH INFORMATION MANAGEMENT | Facility: CLINIC | Age: 35
End: 2025-04-14
Payer: COMMERCIAL

## 2025-04-18 ENCOUNTER — HOSPITAL ENCOUNTER (OUTPATIENT)
Dept: CT IMAGING | Facility: CLINIC | Age: 35
Discharge: HOME | End: 2025-04-18
Attending: PHYSICIAN ASSISTANT | Admitting: PHYSICIAN ASSISTANT
Payer: COMMERCIAL

## 2025-04-18 DIAGNOSIS — R10.9 UNSPECIFIED ABDOMINAL PAIN: ICD-10-CM

## 2025-04-18 PROCEDURE — 250N000011 HC RX IP 250 OP 636: Performed by: PHYSICIAN ASSISTANT

## 2025-04-18 PROCEDURE — 74178 CT ABD&PLV WO CNTR FLWD CNTR: CPT

## 2025-04-18 RX ORDER — IOPAMIDOL 755 MG/ML
110 INJECTION, SOLUTION INTRAVASCULAR ONCE
Status: COMPLETED | OUTPATIENT
Start: 2025-04-18 | End: 2025-04-18

## 2025-04-18 RX ADMIN — IOPAMIDOL 110 ML: 755 INJECTION, SOLUTION INTRAVENOUS at 09:25
